# Patient Record
Sex: FEMALE | Race: WHITE | URBAN - METROPOLITAN AREA
[De-identification: names, ages, dates, MRNs, and addresses within clinical notes are randomized per-mention and may not be internally consistent; named-entity substitution may affect disease eponyms.]

---

## 2023-01-23 ENCOUNTER — TELEPHONE (OUTPATIENT)
Dept: OBGYN CLINIC | Facility: CLINIC | Age: 28
End: 2023-01-23

## 2023-01-23 ENCOUNTER — PATIENT MESSAGE (OUTPATIENT)
Dept: OBGYN CLINIC | Facility: CLINIC | Age: 28
End: 2023-01-23

## 2023-01-23 LAB
AMB EXT ANTIBODY SCREEN: NEGATIVE
AMB EXT GONOCOCCUS, NUCLEIC ACID AMP: NEGATIVE
AMB EXT HEMOGLOBIN: 13.6
AMB EXT HIV AG AB COMBO: NON REACTIVE
AMB EXT TSH: 2.88 MIU/ML
AMB EXT URINE CULTURE ROUTINE: NEGATIVE

## 2023-01-23 NOTE — TELEPHONE ENCOUNTER
Transferring care, patient's old office is requesting a records release send to their fax [phone] [facility].  [phone]

## 2023-01-23 NOTE — TELEPHONE ENCOUNTER
Pt Name and  verified. Pt is calling as she is seeking to possibly transfer care. Pt informed that TAYLOR needs to be filled out so that our office can request her records from previous facility. Pt TAMIE. This RN sent over link via Test message for her to sign up to [employer]. Pt received message to set up eyesFinderhart while on the phone with this RN and message sent with link to have her fill out TAYLOR. Look for [employer] message so TAYLOR can be sent to number provided.

## 2023-01-31 ENCOUNTER — PATIENT MESSAGE (OUTPATIENT)
Dept: OBGYN CLINIC | Facility: CLINIC | Age: 28
End: 2023-01-31

## 2023-01-31 NOTE — TELEPHONE ENCOUNTER
From: Dion Mccarty  To: Cecilia Hernandez  Sent: 1/23/2023 2:16 PM CST  Subject: TAYLOR    Please copy the following link on to your browser. Fill out form and sign, and send back to us so we may request your records. [email or web address]      Thank you,   Tariq Newton

## 2023-02-07 ENCOUNTER — NURSE ONLY (OUTPATIENT)
Dept: OBGYN CLINIC | Facility: CLINIC | Age: 28
End: 2023-02-07

## 2023-02-07 ENCOUNTER — TELEPHONE (OUTPATIENT)
Dept: OBGYN CLINIC | Facility: CLINIC | Age: 28
End: 2023-02-07

## 2023-02-07 VITALS — HEIGHT: 67 IN

## 2023-02-07 DIAGNOSIS — Z34.82 ENCOUNTER FOR SUPERVISION OF OTHER NORMAL PREGNANCY IN SECOND TRIMESTER: Primary | ICD-10-CM

## 2023-02-07 RX ORDER — CHOLECALCIFEROL (VITAMIN D3) 25 MCG
1 TABLET,CHEWABLE ORAL DAILY
COMMUNITY

## 2023-02-07 RX ORDER — NIFEDIPINE 20 MG/1
20 CAPSULE ORAL 3 TIMES DAILY
COMMUNITY

## 2023-02-07 RX ORDER — LEVOTHYROXINE SODIUM 0.1 MG/1
100 TABLET ORAL
COMMUNITY

## 2023-02-07 RX ORDER — BUPROPION HYDROCHLORIDE 300 MG/1
300 TABLET ORAL DAILY
COMMUNITY

## 2023-02-07 RX ORDER — PROGESTERONE 200 MG/1
200 CAPSULE ORAL NIGHTLY
COMMUNITY

## 2023-02-07 NOTE — PROGRESS NOTES
Had phone nurse consultation     Missed menses apt with: former provider at  [facility] [place] [facility] [place] [place]  Was on [place] with IUI to conceive [facility]    Pt transfering care to [facility] since she is moving to [facility]    Hx of PCOS, hypothyroid, depression and anxiety and cervical incompetence    Pt had a Shirley cerclage placement on 2023 by Dr. Martha Sierra  For cervical insufficiency based on ultrasound and no functional cervix on exam- found to be 3 cm dilated at 18 weeks      LMP: 2022    Pre  BMI: 36.8  EPDS score: 2    US: 22  Working JUSTUS: 23  Hx of genetic abnormality in family: None  Hx of varicella: Yes  Covid Vaccine:     Sterilization/Contraception: N/A    OUD Screening:Pt. Answered YES to any 5P questions and/or  risk factors are present. Per pt her father had hx of alcohol and drug abuse, but has been sober for over 10 years. Pt denied any other exposure to alcohol or substances, no referral per pt. Pt. Education was provided on OUD and pregnancy, including the benefits of treatment for mom and baby and the risk of STEVE. Pt to be given OUD and STEVE handouts. Educational material reviewed with patient: Prenatal care, nutrition, weight gain recommendations, travel, exercise, intercourse, pregnancy changes, safe medications, pregnancy and work, fetal movement, labor and  labor, warning signs, food safety, tdap, cord blood, breastfeeding, circumcision, and Group B strep. Blood transfusion if needed: Y  PN labs:  Will review medical record to assess need for additional labs    Pt had a normal FTS ultrasound 22  Amniocentesis 3/9/73    [facility] Media Policy: Reviewed    NOB apt: Scheduled with MLM

## 2023-02-23 ENCOUNTER — INITIAL PRENATAL (OUTPATIENT)
Dept: OBGYN CLINIC | Facility: CLINIC | Age: 28
End: 2023-02-23

## 2023-02-23 ENCOUNTER — TELEPHONE (OUTPATIENT)
Dept: OBGYN CLINIC | Facility: CLINIC | Age: 28
End: 2023-02-23

## 2023-02-23 VITALS — DIASTOLIC BLOOD PRESSURE: 78 MMHG | BODY MASS INDEX: 39 KG/M2 | WEIGHT: 249 LBS | SYSTOLIC BLOOD PRESSURE: 124 MMHG

## 2023-02-23 DIAGNOSIS — N88.3 INCOMPETENT CERVIX: ICD-10-CM

## 2023-02-23 DIAGNOSIS — E66.9 OBESITY, UNSPECIFIED CLASSIFICATION, UNSPECIFIED OBESITY TYPE, UNSPECIFIED WHETHER SERIOUS COMORBIDITY PRESENT: ICD-10-CM

## 2023-02-23 DIAGNOSIS — Z34.82 ENCOUNTER FOR SUPERVISION OF OTHER NORMAL PREGNANCY IN SECOND TRIMESTER: Primary | ICD-10-CM

## 2023-02-23 PROBLEM — O99.212 OBESITY AFFECTING PREGNANCY IN SECOND TRIMESTER: Status: ACTIVE | Noted: 2023-02-23

## 2023-02-23 PROBLEM — O09.812 PREGNANCY RESULTING FROM ASSISTED REPRODUCTIVE TECHNOLOGY IN SECOND TRIMESTER: Status: ACTIVE | Noted: 2023-02-23

## 2023-02-23 PROBLEM — O99.212 OBESITY AFFECTING PREGNANCY IN SECOND TRIMESTER (HCC): Status: ACTIVE | Noted: 2023-02-23

## 2023-02-23 PROBLEM — O09.812 PREGNANCY RESULTING FROM ASSISTED REPRODUCTIVE TECHNOLOGY IN SECOND TRIMESTER (HCC): Status: ACTIVE | Noted: 2023-02-23

## 2023-02-23 LAB
BILIRUBIN: NEGATIVE
GLUCOSE (URINE DIPSTICK): NEGATIVE MG/DL
KETONES (URINE DIPSTICK): NEGATIVE MG/DL
LEUKOCYTES: NEGATIVE
MULTISTIX LOT#: NORMAL NUMERIC
NITRITE, URINE: NEGATIVE
OCCULT BLOOD: NEGATIVE
PH, URINE: 6.5 (ref 4.5–8)
PROTEIN (URINE DIPSTICK): NEGATIVE MG/DL
SPECIFIC GRAVITY: 1.01 (ref 1–1.03)
URINE-COLOR: YELLOW
UROBILINOGEN,SEMI-QN: 0.2 MG/DL (ref 0–1.9)

## 2023-02-23 PROCEDURE — 81002 URINALYSIS NONAUTO W/O SCOPE: CPT | Performed by: OBSTETRICS & GYNECOLOGY

## 2023-02-23 PROCEDURE — 3078F DIAST BP <80 MM HG: CPT | Performed by: OBSTETRICS & GYNECOLOGY

## 2023-02-23 PROCEDURE — 3074F SYST BP LT 130 MM HG: CPT | Performed by: OBSTETRICS & GYNECOLOGY

## 2023-02-23 NOTE — PROGRESS NOTES
Transfer of Care. [place]oved [place]. H/O IUI. S/P Rescue Cerclage at 18 weeks due to Cervical Incompetence dilated to 3 cm. To see RICHELLE ASAP. Labs x 1 week.

## 2023-02-23 NOTE — TELEPHONE ENCOUNTER
Incoming fax received from [employer]. Written orders for breast pump, compression stockings, and sacroiliac support were signed by [facility] and faxed back.

## 2023-03-02 ENCOUNTER — LAB ENCOUNTER (OUTPATIENT)
Dept: LAB | Age: 28
End: 2023-03-02
Attending: OBSTETRICS & GYNECOLOGY
Payer: COMMERCIAL

## 2023-03-02 DIAGNOSIS — Z34.82 ENCOUNTER FOR SUPERVISION OF OTHER NORMAL PREGNANCY IN SECOND TRIMESTER: ICD-10-CM

## 2023-03-02 LAB
BASOPHILS # BLD AUTO: 0.02 X10(3) UL (ref 0–0.2)
BASOPHILS NFR BLD AUTO: 0.2 %
DEPRECATED HBV CORE AB SER IA-ACNC: 86.2 NG/ML
DEPRECATED RDW RBC AUTO: 40.5 FL (ref 35.1–46.3)
EOSINOPHIL # BLD AUTO: 0.07 X10(3) UL (ref 0–0.7)
EOSINOPHIL NFR BLD AUTO: 0.6 %
ERYTHROCYTE [DISTWIDTH] IN BLOOD BY AUTOMATED COUNT: 12 % (ref 11–15)
GLUCOSE 1H P GLC SERPL-MCNC: 162 MG/DL
HBV SURFACE AG SER-ACNC: 0.12 [IU]/L
HBV SURFACE AG SERPL QL IA: NONREACTIVE
HCT VFR BLD AUTO: 36.5 %
HCV AB SERPL QL IA: NONREACTIVE
HGB BLD-MCNC: 12.5 G/DL
IMM GRANULOCYTES # BLD AUTO: 0.05 X10(3) UL (ref 0–1)
IMM GRANULOCYTES NFR BLD: 0.4 %
LYMPHOCYTES # BLD AUTO: 2.2 X10(3) UL (ref 1–4)
LYMPHOCYTES NFR BLD AUTO: 17.6 %
MCH RBC QN AUTO: 31.6 PG (ref 26–34)
MCHC RBC AUTO-ENTMCNC: 34.2 G/DL (ref 31–37)
MCV RBC AUTO: 92.2 FL
MONOCYTES # BLD AUTO: 0.46 X10(3) UL (ref 0.1–1)
MONOCYTES NFR BLD AUTO: 3.7 %
NEUTROPHILS # BLD AUTO: 9.7 X10 (3) UL (ref 1.5–7.7)
NEUTROPHILS # BLD AUTO: 9.7 X10(3) UL (ref 1.5–7.7)
NEUTROPHILS NFR BLD AUTO: 77.5 %
PLATELET # BLD AUTO: 300 10(3)UL (ref 150–450)
RBC # BLD AUTO: 3.96 X10(6)UL
RUBV IGG SER QL: POSITIVE
RUBV IGG SER-ACNC: 26.6 IU/ML (ref 10–?)
TSI SER-ACNC: 0.58 MIU/ML (ref 0.36–3.74)
WBC # BLD AUTO: 12.5 X10(3) UL (ref 4–11)

## 2023-03-02 PROCEDURE — 87340 HEPATITIS B SURFACE AG IA: CPT

## 2023-03-02 PROCEDURE — 36415 COLL VENOUS BLD VENIPUNCTURE: CPT

## 2023-03-02 PROCEDURE — 85025 COMPLETE CBC W/AUTO DIFF WBC: CPT

## 2023-03-02 PROCEDURE — 82950 GLUCOSE TEST: CPT

## 2023-03-02 PROCEDURE — 84443 ASSAY THYROID STIM HORMONE: CPT

## 2023-03-02 PROCEDURE — 86803 HEPATITIS C AB TEST: CPT

## 2023-03-02 PROCEDURE — 82728 ASSAY OF FERRITIN: CPT | Performed by: OBSTETRICS & GYNECOLOGY

## 2023-03-02 PROCEDURE — 86777 TOXOPLASMA ANTIBODY: CPT

## 2023-03-02 PROCEDURE — 86780 TREPONEMA PALLIDUM: CPT

## 2023-03-02 PROCEDURE — 86778 TOXOPLASMA ANTIBODY IGM: CPT

## 2023-03-02 PROCEDURE — 86762 RUBELLA ANTIBODY: CPT

## 2023-03-03 ENCOUNTER — TELEPHONE (OUTPATIENT)
Dept: OBGYN CLINIC | Facility: CLINIC | Age: 28
End: 2023-03-03

## 2023-03-03 DIAGNOSIS — R73.09 ABNORMAL GTT (GLUCOSE TOLERANCE TEST): Primary | ICD-10-CM

## 2023-03-03 LAB — T PALLIDUM AB SER QL: NEGATIVE

## 2023-03-03 NOTE — TELEPHONE ENCOUNTER
----- Message from Mary Beth Bejarano MD sent at 3/2/2023 10:02 PM CST -----  Abnormal 1 hr GTT. Send for 3 hr GTT ASAP.

## 2023-03-03 NOTE — TELEPHONE ENCOUNTER
Attempted to reach patient no answer left detailed message to call back office. Ordered 3hr GTT for patient.

## 2023-03-03 NOTE — TELEPHONE ENCOUNTER
Patient name and  verified. Patient informed of MLM result note and recommendations. Order for 3 hour gtt entered. Patient states she is on bed rest and cannot sit up right. Patient is agreeable to 3 hour gtt but unsure if she can be accommodated with lab or should she wait until she is evaluated with MFM. This RN contacted lab and was informed Leory Spurling- lab supervisor that only [facility] lab has a recliner that patient can use during 3 hour. Did mention pt will not be laying down but does recline half way. If ML[facility] is agreeable Carla Martinez will need to be contacted for accommodations at ext. [phone].

## 2023-03-04 LAB
TOXOPLASMA GONDII AB, IGG: 3.2 IU/ML
TOXOPLASMA GONDII AB, IGM: <3 AU/ML

## 2023-03-09 ENCOUNTER — ROUTINE PRENATAL (OUTPATIENT)
Dept: OBGYN CLINIC | Facility: CLINIC | Age: 28
End: 2023-03-09

## 2023-03-09 VITALS — SYSTOLIC BLOOD PRESSURE: 127 MMHG | BODY MASS INDEX: 40 KG/M2 | WEIGHT: 253.75 LBS | DIASTOLIC BLOOD PRESSURE: 82 MMHG

## 2023-03-09 DIAGNOSIS — R73.09 ABNORMAL GTT (GLUCOSE TOLERANCE TEST): ICD-10-CM

## 2023-03-09 DIAGNOSIS — Z34.83 ENCOUNTER FOR SUPERVISION OF OTHER NORMAL PREGNANCY IN THIRD TRIMESTER: Primary | ICD-10-CM

## 2023-03-09 LAB
BILIRUBIN: NEGATIVE
GLUCOSE (URINE DIPSTICK): NEGATIVE MG/DL
KETONES (URINE DIPSTICK): NEGATIVE MG/DL
LEUKOCYTES: NEGATIVE
MULTISTIX LOT#: ABNORMAL NUMERIC
NITRITE, URINE: NEGATIVE
OCCULT BLOOD: NEGATIVE
PH, URINE: 6 (ref 4.5–8)
PROTEIN (URINE DIPSTICK): NEGATIVE MG/DL
SPECIFIC GRAVITY: 1.01 (ref 1–1.03)
URINE-COLOR: YELLOW
UROBILINOGEN,SEMI-QN: 0.2 MG/DL (ref 0–1.9)

## 2023-03-09 PROCEDURE — 3074F SYST BP LT 130 MM HG: CPT | Performed by: OBSTETRICS & GYNECOLOGY

## 2023-03-09 PROCEDURE — 90715 TDAP VACCINE 7 YRS/> IM: CPT | Performed by: OBSTETRICS & GYNECOLOGY

## 2023-03-09 PROCEDURE — 81003 URINALYSIS AUTO W/O SCOPE: CPT | Performed by: OBSTETRICS & GYNECOLOGY

## 2023-03-09 PROCEDURE — 3079F DIAST BP 80-89 MM HG: CPT | Performed by: OBSTETRICS & GYNECOLOGY

## 2023-03-09 PROCEDURE — 90471 IMMUNIZATION ADMIN: CPT | Performed by: OBSTETRICS & GYNECOLOGY

## 2023-03-09 RX ORDER — LANCETS
EACH MISCELLANEOUS
Qty: 200 EACH | Refills: 2 | Status: SHIPPED | OUTPATIENT
Start: 2023-03-09

## 2023-03-09 NOTE — PROGRESS NOTES
1hr GTT abnormal, will do 4x daily accucheck instead of 3hr GTT. Seeing M tomorrow for US. Kick Counts Reviewed.

## 2023-03-10 ENCOUNTER — HOSPITAL ENCOUNTER (OUTPATIENT)
Dept: PERINATAL CARE | Facility: HOSPITAL | Age: 28
Discharge: HOME | End: 2023-03-10
Attending: OBSTETRICS & GYNECOLOGY
Payer: COMMERCIAL

## 2023-03-10 VITALS
BODY MASS INDEX: 39 KG/M2 | SYSTOLIC BLOOD PRESSURE: 129 MMHG | DIASTOLIC BLOOD PRESSURE: 76 MMHG | WEIGHT: 250 LBS | HEART RATE: 118 BPM

## 2023-03-10 DIAGNOSIS — O99.212 OBESITY AFFECTING PREGNANCY IN SECOND TRIMESTER: Primary | ICD-10-CM

## 2023-03-10 DIAGNOSIS — N88.3 INCOMPETENT CERVIX: ICD-10-CM

## 2023-03-10 DIAGNOSIS — O34.32 CERVICAL CERCLAGE SUTURE PRESENT IN SECOND TRIMESTER: ICD-10-CM

## 2023-03-10 DIAGNOSIS — O99.282 HYPOTHYROIDISM COMPLICATING PREGNANCY, SECOND TRIMESTER: ICD-10-CM

## 2023-03-10 DIAGNOSIS — E03.9 HYPOTHYROIDISM COMPLICATING PREGNANCY, SECOND TRIMESTER: ICD-10-CM

## 2023-03-10 DIAGNOSIS — O99.212 OBESITY AFFECTING PREGNANCY IN SECOND TRIMESTER: ICD-10-CM

## 2023-03-10 PROCEDURE — 76811 OB US DETAILED SNGL FETUS: CPT | Performed by: OBSTETRICS & GYNECOLOGY

## 2023-03-20 ENCOUNTER — LABORATORY ENCOUNTER (OUTPATIENT)
Dept: LAB | Facility: HOSPITAL | Age: 28
End: 2023-03-20
Attending: OBSTETRICS & GYNECOLOGY
Payer: COMMERCIAL

## 2023-03-20 DIAGNOSIS — R73.09 ABNORMAL GTT (GLUCOSE TOLERANCE TEST): ICD-10-CM

## 2023-03-20 LAB
BASOPHILS # BLD AUTO: 0.04 X10(3) UL (ref 0–0.2)
BASOPHILS NFR BLD AUTO: 0.3 %
DEPRECATED HBV CORE AB SER IA-ACNC: 67 NG/ML
DEPRECATED RDW RBC AUTO: 39.8 FL (ref 35.1–46.3)
EOSINOPHIL # BLD AUTO: 0.15 X10(3) UL (ref 0–0.7)
EOSINOPHIL NFR BLD AUTO: 1.1 %
ERYTHROCYTE [DISTWIDTH] IN BLOOD BY AUTOMATED COUNT: 11.9 % (ref 11–15)
GLUCOSE 1H P GLC SERPL-MCNC: 158 MG/DL
GLUCOSE 2H P GLC SERPL-MCNC: 186 MG/DL
GLUCOSE 3H P GLC SERPL-MCNC: 144 MG/DL (ref 70–140)
GLUCOSE P FAST SERPL-MCNC: 80 MG/DL
HCT VFR BLD AUTO: 36 %
HGB BLD-MCNC: 12.5 G/DL
IMM GRANULOCYTES # BLD AUTO: 0.07 X10(3) UL (ref 0–1)
IMM GRANULOCYTES NFR BLD: 0.5 %
LYMPHOCYTES # BLD AUTO: 2.89 X10(3) UL (ref 1–4)
LYMPHOCYTES NFR BLD AUTO: 21.3 %
MCH RBC QN AUTO: 31.7 PG (ref 26–34)
MCHC RBC AUTO-ENTMCNC: 34.7 G/DL (ref 31–37)
MCV RBC AUTO: 91.4 FL
MONOCYTES # BLD AUTO: 0.7 X10(3) UL (ref 0.1–1)
MONOCYTES NFR BLD AUTO: 5.2 %
NEUTROPHILS # BLD AUTO: 9.73 X10 (3) UL (ref 1.5–7.7)
NEUTROPHILS # BLD AUTO: 9.73 X10(3) UL (ref 1.5–7.7)
NEUTROPHILS NFR BLD AUTO: 71.6 %
PLATELET # BLD AUTO: 278 10(3)UL (ref 150–450)
RBC # BLD AUTO: 3.94 X10(6)UL
WBC # BLD AUTO: 13.6 X10(3) UL (ref 4–11)

## 2023-03-20 PROCEDURE — 82952 GTT-ADDED SAMPLES: CPT

## 2023-03-20 PROCEDURE — 87389 HIV-1 AG W/HIV-1&-2 AB AG IA: CPT | Performed by: OBSTETRICS & GYNECOLOGY

## 2023-03-20 PROCEDURE — 85025 COMPLETE CBC W/AUTO DIFF WBC: CPT | Performed by: OBSTETRICS & GYNECOLOGY

## 2023-03-20 PROCEDURE — 86780 TREPONEMA PALLIDUM: CPT | Performed by: OBSTETRICS & GYNECOLOGY

## 2023-03-20 PROCEDURE — 82728 ASSAY OF FERRITIN: CPT | Performed by: OBSTETRICS & GYNECOLOGY

## 2023-03-20 PROCEDURE — 36415 COLL VENOUS BLD VENIPUNCTURE: CPT | Performed by: OBSTETRICS & GYNECOLOGY

## 2023-03-20 PROCEDURE — 82951 GLUCOSE TOLERANCE TEST (GTT): CPT

## 2023-03-21 ENCOUNTER — PATIENT MESSAGE (OUTPATIENT)
Dept: OBGYN CLINIC | Facility: CLINIC | Age: 28
End: 2023-03-21

## 2023-03-21 DIAGNOSIS — O24.410 DIET CONTROLLED GESTATIONAL DIABETES MELLITUS (GDM) IN THIRD TRIMESTER: Primary | ICD-10-CM

## 2023-03-22 LAB — T PALLIDUM AB SER QL: NEGATIVE

## 2023-03-24 ENCOUNTER — ROUTINE PRENATAL (OUTPATIENT)
Dept: OBGYN CLINIC | Facility: CLINIC | Age: 28
End: 2023-03-24

## 2023-03-24 VITALS — BODY MASS INDEX: 40 KG/M2 | DIASTOLIC BLOOD PRESSURE: 80 MMHG | SYSTOLIC BLOOD PRESSURE: 126 MMHG | WEIGHT: 254 LBS

## 2023-03-24 DIAGNOSIS — Z34.83 ENCOUNTER FOR SUPERVISION OF OTHER NORMAL PREGNANCY IN THIRD TRIMESTER: Primary | ICD-10-CM

## 2023-03-24 LAB
APPEARANCE: CLEAR
BILIRUBIN: NEGATIVE
GLUCOSE (URINE DIPSTICK): NEGATIVE MG/DL
KETONES (URINE DIPSTICK): 80 MG/DL
MULTISTIX LOT#: ABNORMAL NUMERIC
NITRITE, URINE: NEGATIVE
PH, URINE: 6 (ref 4.5–8)
PROTEIN (URINE DIPSTICK): NEGATIVE MG/DL
SPECIFIC GRAVITY: 1.02 (ref 1–1.03)
URINE-COLOR: YELLOW
UROBILINOGEN,SEMI-QN: 0.2 MG/DL (ref 0–1.9)

## 2023-03-24 PROCEDURE — 3079F DIAST BP 80-89 MM HG: CPT | Performed by: OBSTETRICS & GYNECOLOGY

## 2023-03-24 PROCEDURE — 3074F SYST BP LT 130 MM HG: CPT | Performed by: OBSTETRICS & GYNECOLOGY

## 2023-03-24 PROCEDURE — 81003 URINALYSIS AUTO W/O SCOPE: CPT | Performed by: OBSTETRICS & GYNECOLOGY

## 2023-03-24 RX ORDER — LEVOTHYROXINE SODIUM 0.07 MG/1
TABLET ORAL
COMMUNITY
Start: 2023-01-09

## 2023-03-24 NOTE — PROGRESS NOTES
Blood sugars all wnl per pt. Has appt with diabetes center. Started checking her blood sugars about 2 weeks ago after her mfm appt. On modified bedrest due to rescue cerclage hx. On vag prog. No c/o ptl sx.

## 2023-03-27 ENCOUNTER — HOSPITAL ENCOUNTER (OUTPATIENT)
Dept: ENDOCRINOLOGY | Facility: HOSPITAL | Age: 28
End: 2023-03-27
Attending: OBSTETRICS & GYNECOLOGY
Payer: COMMERCIAL

## 2023-03-27 DIAGNOSIS — O24.419 GESTATIONAL DIABETES MELLITUS (GDM), ANTEPARTUM, GESTATIONAL DIABETES METHOD OF CONTROL UNSPECIFIED: Primary | ICD-10-CM

## 2023-03-27 RX ORDER — BLOOD-GLUCOSE METER
1 EACH MISCELLANEOUS 4 TIMES DAILY
Qty: 1 KIT | Refills: 0 | Status: SHIPPED | OUTPATIENT
Start: 2023-03-27 | End: 2023-07-25

## 2023-03-27 RX ORDER — LANCETS 30 GAUGE
4 EACH MISCELLANEOUS 4 TIMES DAILY
Qty: 100 EACH | Refills: 3 | Status: SHIPPED | OUTPATIENT
Start: 2023-03-27 | End: 2023-07-25

## 2023-03-27 RX ORDER — BLOOD SUGAR DIAGNOSTIC
1 STRIP MISCELLANEOUS 4 TIMES DAILY
Qty: 100 STRIP | Refills: 5 | Status: SHIPPED | OUTPATIENT
Start: 2023-03-27 | End: 2023-07-25

## 2023-03-27 NOTE — PROGRESS NOTES
Funmilayo Mims  : 1995 was seen for Gestational Diabetes Counseling: Individual    Date: 3/27/2023   Start time: 1015A End time: 1115A  Realtime audio-video visit via [employer] due to pt on bedrest.  Pt verbally consents to this audio-video visit. Obtained usual diet history:   Saw nutritionist at fertility clinic  (am) frank burk (44g), avocado, egg, everything but the bagel seasoning   (snacks) harder, typically just has meals  (lunch) beef jerky, cheese, unsweetened apple sauce   (dinner) breaded chicken, corn on the cob  (snacks)     Education:     GDM Overview:  Reviewed gestational diabetes as diagnosis including target blood glucose values. Benefits, risks, and management options for improving/maintaining glucose control to mother/baby discussed. Instructed /demonstrated ability to perform blood glucose testing on:   Discussed monitoring ketones. Healthy Eating:  Discussed nutrition concepts for pregnancy/healthy eating and effects of food on BG value. Timing of meals; what is a carbohydrate, protein, fat. Taught: Carb counting, label reading, meal planning. Suggested Calorie Level: 2000    Being Active:  Benefits and effects of activity on BG discussed. Monitoring:  Instructed on how to use glucose monitor/proper lancet disposal. Testing schedules are:   Fastin-95 mg/dL, Call MD if >95 mg/dL twice in 1 week   2 Hour Post Prandial:  120 md/dL, Call MD if >120 mg/dL twice in 1 week. Taking Medication:  Reviewed when medication might be indicated. Reducing Risk:  Effects of elevated blood glucose on mother/baby reviewed. Discussed management (hyperglycemia, hypoglycemia, sick day, DKA, other) and when to call provider. Post pregnancy management/prevention of Type 2 DM, and increased risk of having diabetes later in life reviewed. Healthy Coping:  Family involvement/social support encouraged.   Identification of lifestyle behaviors willing to change discussed. Training Tools Provided:  Printed materials covering each topic provided. Support Plan provided and reviewed. Patient Chosen Goals:      1. Follow recommended GDM meal plan. 2. Begin testing fasting glucose and 2 hours after meals   3. Bring glucose log to each MD office visit. 4. Encouraged activity if no restrictions. 5. Encouraged Carolyne Dafne to call diabetes center with any questions or concerns. Patient verbalized understanding and has no further questions at this time.     Eulalio Libman, RD

## 2023-04-03 ENCOUNTER — ROUTINE PRENATAL (OUTPATIENT)
Dept: OBGYN CLINIC | Facility: CLINIC | Age: 28
End: 2023-04-03

## 2023-04-03 VITALS — SYSTOLIC BLOOD PRESSURE: 122 MMHG | BODY MASS INDEX: 40 KG/M2 | WEIGHT: 254 LBS | DIASTOLIC BLOOD PRESSURE: 76 MMHG

## 2023-04-03 DIAGNOSIS — Z34.83 ENCOUNTER FOR SUPERVISION OF OTHER NORMAL PREGNANCY IN THIRD TRIMESTER: Primary | ICD-10-CM

## 2023-04-03 LAB
APPEARANCE: CLEAR
BILIRUBIN: NEGATIVE
GLUCOSE (URINE DIPSTICK): NEGATIVE MG/DL
KETONES (URINE DIPSTICK): NEGATIVE MG/DL
LEUKOCYTES: NEGATIVE
MULTISTIX LOT#: NORMAL NUMERIC
NITRITE, URINE: NEGATIVE
OCCULT BLOOD: NEGATIVE
PH, URINE: 6.5 (ref 4.5–8)
PROTEIN (URINE DIPSTICK): NEGATIVE MG/DL
SPECIFIC GRAVITY: 1.01 (ref 1–1.03)
URINE-COLOR: YELLOW
UROBILINOGEN,SEMI-QN: 0.2 MG/DL (ref 0–1.9)

## 2023-04-03 PROCEDURE — 3074F SYST BP LT 130 MM HG: CPT | Performed by: OBSTETRICS & GYNECOLOGY

## 2023-04-03 PROCEDURE — 81002 URINALYSIS NONAUTO W/O SCOPE: CPT | Performed by: OBSTETRICS & GYNECOLOGY

## 2023-04-03 PROCEDURE — 3078F DIAST BP <80 MM HG: CPT | Performed by: OBSTETRICS & GYNECOLOGY

## 2023-04-03 NOTE — PROGRESS NOTES
Pt doing well. Pregnancy counseling. No c/o. Good duke. [place] alexis. bs wnl since she started evening snacks.

## 2023-04-07 ENCOUNTER — TELEPHONE (OUTPATIENT)
Dept: PERINATAL CARE | Facility: HOSPITAL | Age: 28
End: 2023-04-07

## 2023-04-07 ENCOUNTER — HOSPITAL ENCOUNTER (OUTPATIENT)
Dept: PERINATAL CARE | Facility: HOSPITAL | Age: 28
Discharge: HOME | End: 2023-04-07
Attending: OBSTETRICS & GYNECOLOGY
Payer: COMMERCIAL

## 2023-04-07 ENCOUNTER — HOSPITAL ENCOUNTER (OUTPATIENT)
Facility: HOSPITAL | Age: 28
Discharge: HOME | End: 2023-04-07
Attending: OBSTETRICS & GYNECOLOGY | Admitting: OBSTETRICS & GYNECOLOGY
Payer: COMMERCIAL

## 2023-04-07 VITALS — DIASTOLIC BLOOD PRESSURE: 76 MMHG | SYSTOLIC BLOOD PRESSURE: 122 MMHG | HEART RATE: 106 BPM

## 2023-04-07 VITALS
SYSTOLIC BLOOD PRESSURE: 126 MMHG | BODY MASS INDEX: 40 KG/M2 | HEART RATE: 105 BPM | DIASTOLIC BLOOD PRESSURE: 75 MMHG | WEIGHT: 254 LBS

## 2023-04-07 DIAGNOSIS — O09.812 PREGNANCY RESULTING FROM ASSISTED REPRODUCTIVE TECHNOLOGY IN SECOND TRIMESTER: ICD-10-CM

## 2023-04-07 DIAGNOSIS — O99.282 HYPOTHYROIDISM COMPLICATING PREGNANCY, SECOND TRIMESTER: ICD-10-CM

## 2023-04-07 DIAGNOSIS — N88.3 INCOMPETENT CERVIX: ICD-10-CM

## 2023-04-07 DIAGNOSIS — O34.32 CERVICAL CERCLAGE SUTURE PRESENT IN SECOND TRIMESTER: ICD-10-CM

## 2023-04-07 DIAGNOSIS — O99.213 OBESITY AFFECTING PREGNANCY IN THIRD TRIMESTER: Primary | ICD-10-CM

## 2023-04-07 DIAGNOSIS — O24.410 DIET CONTROLLED GESTATIONAL DIABETES MELLITUS (GDM) IN THIRD TRIMESTER: ICD-10-CM

## 2023-04-07 DIAGNOSIS — O99.213 OBESITY AFFECTING PREGNANCY IN THIRD TRIMESTER: ICD-10-CM

## 2023-04-07 DIAGNOSIS — O34.33 CERVICAL CERCLAGE SUTURE PRESENT IN THIRD TRIMESTER: ICD-10-CM

## 2023-04-07 DIAGNOSIS — E03.9 HYPOTHYROIDISM AFFECTING PREGNANCY IN THIRD TRIMESTER: ICD-10-CM

## 2023-04-07 DIAGNOSIS — E03.9 HYPOTHYROIDISM COMPLICATING PREGNANCY, SECOND TRIMESTER: ICD-10-CM

## 2023-04-07 DIAGNOSIS — O99.283 HYPOTHYROIDISM AFFECTING PREGNANCY IN THIRD TRIMESTER: ICD-10-CM

## 2023-04-07 PROCEDURE — 76816 OB US FOLLOW-UP PER FETUS: CPT | Performed by: OBSTETRICS & GYNECOLOGY

## 2023-04-07 PROCEDURE — 59025 FETAL NON-STRESS TEST: CPT

## 2023-04-07 PROCEDURE — 99214 OFFICE O/P EST MOD 30 MIN: CPT

## 2023-04-07 NOTE — TRIAGE
[facility]      Triage Note    Allyssa Goddard Patient Status:  Outpatient    1995 MRN [id number]   Location [address] Attending Belen Stephenson MD   Hosp Day # 0 PCP No primary care provider on file.  Para:   Estimated Date of Delivery: 23  Gestation: 31w2d    Chief Complaint    R/o Rom         Allergies:    Seasonal                ITCHING    No orders of the defined types were placed in this encounter. Lab Results   Component Value Date    WBC 13.6 (H) 2023    HGB 12.5 2023    HCT 36.0 2023    .0 2023    TSH 0.581 2023       Clinitek UA  Lab Results   Component Value Date    SPECGRAVITY 1.015 2023    URINECUL No Growth at 18-24 hrs. 2023       UA  Lab Results   Component Value Date    SPECGRAVITY 1.015 2023    NITRITE Negative 2023  1645   BP: 122/76   Pulse: 106       NST  Variability: Moderate           Accelerations: Yes           Decelerations: None            Baseline: 140 BPM           Uterine Irritability: No           Contractions: Not present                                        Acoustic Stimulator: No           Nonstress Test Interpretation: Appropriate for gestational age           Nonstress Test Second Interpretation: Appropriate for gestational age          FHR Category: Category I             Additional Comments       Patient presents with:  R/o Rom: Pt  has had increased discharge last 3-4 days. Came after mfm appointment. ANTIONE. Pt reports good fetal movement. ANTIONE wnl in mfm. Sterile speculum exam with no pooling of fluid, negative ferning, negative amniotest. NST appropriate for gestational age. Findings reported to Dr Jennifer Salvador per telephone. Pt dc home with labor precautions and kick counts. Pt and SO with verbalized understanding.      Erasto Bhakta RN  2023 5:18 PM

## 2023-04-10 ENCOUNTER — HOSPITAL ENCOUNTER (OUTPATIENT)
Dept: ENDOCRINOLOGY | Facility: HOSPITAL | Age: 28
Discharge: HOME | End: 2023-04-10
Attending: OBSTETRICS & GYNECOLOGY
Payer: COMMERCIAL

## 2023-04-10 NOTE — PROGRESS NOTES
Last visit:  Saw nutritionist at fertility clinic  (am) frank burk (44g), avocado, egg, everything but the bagel seasoning   (snacks) harder, typically just has meals  (lunch) beef jerky, cheese, unsweetened apple sauce   (dinner) breaded chicken, corn on the cob    Maintaining weight    32 weeks  Fasting 90 and under  2 hour pp: under 120 (124 after dinner a few days ago)    Diet Recall today:  (am) apple   (noon) chick filet grilled nuggets (ranch), french fries (1/2)  (pm) 1/4 quesadilla, tortilla soup (tortilla strips)   (am) went out to eat - 2 sausage, 2 allen, 2 eggs, 1 multigrain french toast   (noon) 2 chicken melts (17g carbs x 2), apple   Drinking a lot of water    Logging in notes on phone  Haven't kept track of carbs  Milk, first trimester, threw up

## 2023-04-12 ENCOUNTER — APPOINTMENT (OUTPATIENT)
Dept: OBGYN CLINIC | Facility: HOSPITAL | Age: 28
End: 2023-04-12
Payer: COMMERCIAL

## 2023-04-12 ENCOUNTER — HOSPITAL ENCOUNTER (OUTPATIENT)
Facility: HOSPITAL | Age: 28
Discharge: HOME | End: 2023-04-12
Attending: OBSTETRICS & GYNECOLOGY | Admitting: OBSTETRICS & GYNECOLOGY
Payer: COMMERCIAL

## 2023-04-12 PROCEDURE — 96372 THER/PROPH/DIAG INJ SC/IM: CPT

## 2023-04-12 RX ORDER — BETAMETHASONE SODIUM PHOSPHATE AND BETAMETHASONE ACETATE 3; 3 MG/ML; MG/ML
INJECTION, SUSPENSION INTRA-ARTICULAR; INTRALESIONAL; INTRAMUSCULAR; SOFT TISSUE
Status: COMPLETED
Start: 2023-04-12 | End: 2023-04-12

## 2023-04-12 RX ORDER — BETAMETHASONE SODIUM PHOSPHATE AND BETAMETHASONE ACETATE 3; 3 MG/ML; MG/ML
12 INJECTION, SUSPENSION INTRA-ARTICULAR; INTRALESIONAL; INTRAMUSCULAR; SOFT TISSUE EVERY 24 HOURS
Status: DISCONTINUED | OUTPATIENT
Start: 2023-04-12 | End: 2023-04-12

## 2023-04-12 NOTE — TRIAGE
[facility]      Triage Note    Carolyne Leos Patient Status:  Outpatient    1995 MRN [id number]   Location [address] Attending Jerome Jerez MD   Hosp Day # 0 PCP No primary care provider on file.  Para:   Estimated Date of Delivery: 23  Gestation: 32w0d        Allergies:    Seasonal                ITCHING    No orders of the defined types were placed in this encounter. Lab Results   Component Value Date    WBC 13.6 (H) 2023    HGB 12.5 2023    HCT 36.0 2023    .0 2023    TSH 0.581 2023       Clinitek UA  Lab Results   Component Value Date    SPECGRAVITY 1.015 2023    URINECUL No Growth at 18-24 hrs. 2023       UA  Lab Results   Component Value Date    SPECGRAVITY 1.015 2023    NITRITE Negative 2023       There were no vitals filed for this visit. NST                                                                                                                                                         Additional Comments   Pt here for betamethasone injection per Dr. Gualberto Leggett note. First dose betamethasone injection received today and pt stated that she is returning for second dose on 23 per Dr. Emmanuel Colon. RN notified pt that betamethasone given 24 hrs apart and pt stated that Dr. Emmanuel Colon told her that she can receive the second dose on . Pt scheduled to return 23 at 6 am. Dr. Violette Abdalla notified and T.O. received to discharge pt home. No chief complaint on file.         Tessa Dupont RN  2023 11:56 AM

## 2023-04-14 ENCOUNTER — APPOINTMENT (OUTPATIENT)
Dept: OBGYN CLINIC | Facility: HOSPITAL | Age: 28
End: 2023-04-14
Payer: COMMERCIAL

## 2023-04-14 ENCOUNTER — HOSPITAL ENCOUNTER (OUTPATIENT)
Facility: HOSPITAL | Age: 28
Discharge: HOME | End: 2023-04-14
Attending: OBSTETRICS & GYNECOLOGY | Admitting: OBSTETRICS & GYNECOLOGY
Payer: COMMERCIAL

## 2023-04-14 PROCEDURE — 96372 THER/PROPH/DIAG INJ SC/IM: CPT

## 2023-04-14 RX ORDER — BETAMETHASONE SODIUM PHOSPHATE AND BETAMETHASONE ACETATE 3; 3 MG/ML; MG/ML
12 INJECTION, SUSPENSION INTRA-ARTICULAR; INTRALESIONAL; INTRAMUSCULAR; SOFT TISSUE EVERY 24 HOURS
Status: DISCONTINUED | OUTPATIENT
Start: 2023-04-14 | End: 2023-04-14

## 2023-04-18 ENCOUNTER — ROUTINE PRENATAL (OUTPATIENT)
Dept: OBGYN CLINIC | Facility: CLINIC | Age: 28
End: 2023-04-18

## 2023-04-18 VITALS — DIASTOLIC BLOOD PRESSURE: 86 MMHG | WEIGHT: 251 LBS | SYSTOLIC BLOOD PRESSURE: 136 MMHG | BODY MASS INDEX: 39 KG/M2

## 2023-04-18 DIAGNOSIS — N88.3 INCOMPETENT CERVIX: ICD-10-CM

## 2023-04-18 DIAGNOSIS — O09.819 PREGNANCY RESULTING FROM ASSISTED REPRODUCTIVE TECHNOLOGY, ANTEPARTUM: ICD-10-CM

## 2023-04-18 DIAGNOSIS — Z34.83 ENCOUNTER FOR SUPERVISION OF OTHER NORMAL PREGNANCY IN THIRD TRIMESTER: Primary | ICD-10-CM

## 2023-04-18 DIAGNOSIS — O24.410 DIET CONTROLLED GESTATIONAL DIABETES MELLITUS (GDM) IN THIRD TRIMESTER: ICD-10-CM

## 2023-04-18 DIAGNOSIS — O99.210 OBESITY AFFECTING PREGNANCY, ANTEPARTUM: ICD-10-CM

## 2023-04-18 PROCEDURE — 3079F DIAST BP 80-89 MM HG: CPT | Performed by: OBSTETRICS & GYNECOLOGY

## 2023-04-18 PROCEDURE — 3075F SYST BP GE 130 - 139MM HG: CPT | Performed by: OBSTETRICS & GYNECOLOGY

## 2023-04-18 PROCEDURE — 81002 URINALYSIS NONAUTO W/O SCOPE: CPT | Performed by: OBSTETRICS & GYNECOLOGY

## 2023-05-02 ENCOUNTER — LAB ENCOUNTER (OUTPATIENT)
Dept: LAB | Age: 28
End: 2023-05-02
Attending: OBSTETRICS & GYNECOLOGY
Payer: COMMERCIAL

## 2023-05-02 DIAGNOSIS — Z34.83 ENCOUNTER FOR SUPERVISION OF OTHER NORMAL PREGNANCY IN THIRD TRIMESTER: ICD-10-CM

## 2023-05-02 LAB
BASOPHILS # BLD AUTO: 0.03 X10(3) UL (ref 0–0.2)
BASOPHILS NFR BLD AUTO: 0.3 %
DEPRECATED RDW RBC AUTO: 39.8 FL (ref 35.1–46.3)
EOSINOPHIL # BLD AUTO: 0.08 X10(3) UL (ref 0–0.7)
EOSINOPHIL NFR BLD AUTO: 0.7 %
ERYTHROCYTE [DISTWIDTH] IN BLOOD BY AUTOMATED COUNT: 12 % (ref 11–15)
HCT VFR BLD AUTO: 37.1 %
HGB BLD-MCNC: 12.8 G/DL
IMM GRANULOCYTES # BLD AUTO: 0.04 X10(3) UL (ref 0–1)
IMM GRANULOCYTES NFR BLD: 0.4 %
LYMPHOCYTES # BLD AUTO: 2.28 X10(3) UL (ref 1–4)
LYMPHOCYTES NFR BLD AUTO: 20.2 %
MCH RBC QN AUTO: 31.3 PG (ref 26–34)
MCHC RBC AUTO-ENTMCNC: 34.5 G/DL (ref 31–37)
MCV RBC AUTO: 90.7 FL
MONOCYTES # BLD AUTO: 0.58 X10(3) UL (ref 0.1–1)
MONOCYTES NFR BLD AUTO: 5.1 %
NEUTROPHILS # BLD AUTO: 8.26 X10 (3) UL (ref 1.5–7.7)
NEUTROPHILS # BLD AUTO: 8.26 X10(3) UL (ref 1.5–7.7)
NEUTROPHILS NFR BLD AUTO: 73.3 %
PLATELET # BLD AUTO: 276 10(3)UL (ref 150–450)
RBC # BLD AUTO: 4.09 X10(6)UL
WBC # BLD AUTO: 11.3 X10(3) UL (ref 4–11)

## 2023-05-02 PROCEDURE — 86780 TREPONEMA PALLIDUM: CPT

## 2023-05-02 PROCEDURE — 36415 COLL VENOUS BLD VENIPUNCTURE: CPT

## 2023-05-02 PROCEDURE — 87389 HIV-1 AG W/HIV-1&-2 AB AG IA: CPT

## 2023-05-02 PROCEDURE — 85025 COMPLETE CBC W/AUTO DIFF WBC: CPT

## 2023-05-03 LAB — T PALLIDUM AB SER QL: NEGATIVE

## 2023-05-04 ENCOUNTER — ROUTINE PRENATAL (OUTPATIENT)
Dept: OBGYN CLINIC | Facility: CLINIC | Age: 28
End: 2023-05-04

## 2023-05-04 VITALS — WEIGHT: 257 LBS | DIASTOLIC BLOOD PRESSURE: 83 MMHG | SYSTOLIC BLOOD PRESSURE: 121 MMHG | BODY MASS INDEX: 40 KG/M2

## 2023-05-04 DIAGNOSIS — Z34.83 ENCOUNTER FOR SUPERVISION OF OTHER NORMAL PREGNANCY IN THIRD TRIMESTER: Primary | ICD-10-CM

## 2023-05-04 LAB
APPEARANCE: CLEAR
BILIRUBIN: NEGATIVE
GLUCOSE (URINE DIPSTICK): NEGATIVE MG/DL
KETONES (URINE DIPSTICK): NEGATIVE MG/DL
LEUKOCYTES: NEGATIVE
MULTISTIX LOT#: NORMAL NUMERIC
NITRITE, URINE: NEGATIVE
OCCULT BLOOD: NEGATIVE
PH, URINE: 5 (ref 4.5–8)
PROTEIN (URINE DIPSTICK): NEGATIVE MG/DL
SPECIFIC GRAVITY: 1 (ref 1–1.03)
URINE-COLOR: YELLOW
UROBILINOGEN,SEMI-QN: 0.2 MG/DL (ref 0–1.9)

## 2023-05-04 PROCEDURE — 3074F SYST BP LT 130 MM HG: CPT | Performed by: OBSTETRICS & GYNECOLOGY

## 2023-05-04 PROCEDURE — 3079F DIAST BP 80-89 MM HG: CPT | Performed by: OBSTETRICS & GYNECOLOGY

## 2023-05-04 PROCEDURE — 81002 URINALYSIS NONAUTO W/O SCOPE: CPT | Performed by: OBSTETRICS & GYNECOLOGY

## 2023-05-05 ENCOUNTER — PATIENT MESSAGE (OUTPATIENT)
Dept: OBGYN CLINIC | Facility: CLINIC | Age: 28
End: 2023-05-05

## 2023-05-05 NOTE — TELEPHONE ENCOUNTER
Printed and given to providers and faxed to [phone] [facility] for TA and FBC.

## 2023-05-05 NOTE — TELEPHONE ENCOUNTER
Please print patient's Birth Plan and place it on all doctor's desks for review. Also send a copy to [facility] to be reviewed by the Nursing staff.

## 2023-05-09 ENCOUNTER — ROUTINE PRENATAL (OUTPATIENT)
Dept: OBGYN CLINIC | Facility: CLINIC | Age: 28
End: 2023-05-09

## 2023-05-09 VITALS
BODY MASS INDEX: 40 KG/M2 | DIASTOLIC BLOOD PRESSURE: 84 MMHG | WEIGHT: 255 LBS | SYSTOLIC BLOOD PRESSURE: 120 MMHG | HEART RATE: 88 BPM

## 2023-05-09 DIAGNOSIS — Z34.83 ENCOUNTER FOR SUPERVISION OF OTHER NORMAL PREGNANCY IN THIRD TRIMESTER: Primary | ICD-10-CM

## 2023-05-09 PROCEDURE — 3079F DIAST BP 80-89 MM HG: CPT | Performed by: OBSTETRICS & GYNECOLOGY

## 2023-05-09 PROCEDURE — 3074F SYST BP LT 130 MM HG: CPT | Performed by: OBSTETRICS & GYNECOLOGY

## 2023-05-09 PROCEDURE — 81002 URINALYSIS NONAUTO W/O SCOPE: CPT | Performed by: OBSTETRICS & GYNECOLOGY

## 2023-05-11 ENCOUNTER — HOSPITAL ENCOUNTER (OUTPATIENT)
Facility: HOSPITAL | Age: 28
Discharge: HOME | End: 2023-05-11
Attending: OBSTETRICS & GYNECOLOGY | Admitting: OBSTETRICS & GYNECOLOGY
Payer: COMMERCIAL

## 2023-05-11 ENCOUNTER — HOSPITAL ENCOUNTER (OUTPATIENT)
Dept: PERINATAL CARE | Facility: HOSPITAL | Age: 28
Discharge: HOME | End: 2023-05-11
Attending: OBSTETRICS & GYNECOLOGY
Payer: COMMERCIAL

## 2023-05-11 VITALS
HEART RATE: 85 BPM | RESPIRATION RATE: 16 BRPM | HEIGHT: 67 IN | SYSTOLIC BLOOD PRESSURE: 114 MMHG | TEMPERATURE: 99 F | WEIGHT: 255 LBS | BODY MASS INDEX: 40.02 KG/M2 | DIASTOLIC BLOOD PRESSURE: 75 MMHG

## 2023-05-11 VITALS
WEIGHT: 255 LBS | BODY MASS INDEX: 40 KG/M2 | HEART RATE: 88 BPM | SYSTOLIC BLOOD PRESSURE: 120 MMHG | DIASTOLIC BLOOD PRESSURE: 77 MMHG

## 2023-05-11 DIAGNOSIS — O99.282 HYPOTHYROIDISM COMPLICATING PREGNANCY, SECOND TRIMESTER: ICD-10-CM

## 2023-05-11 DIAGNOSIS — O24.410 DIET CONTROLLED GESTATIONAL DIABETES MELLITUS (GDM) IN THIRD TRIMESTER: ICD-10-CM

## 2023-05-11 DIAGNOSIS — E66.01 MATERNAL MORBID OBESITY, ANTEPARTUM, THIRD TRIMESTER (HCC): ICD-10-CM

## 2023-05-11 DIAGNOSIS — O34.32 CERVICAL CERCLAGE SUTURE PRESENT IN SECOND TRIMESTER: ICD-10-CM

## 2023-05-11 DIAGNOSIS — O99.213 OBESITY AFFECTING PREGNANCY IN THIRD TRIMESTER: ICD-10-CM

## 2023-05-11 DIAGNOSIS — E03.9 HYPOTHYROIDISM COMPLICATING PREGNANCY, SECOND TRIMESTER: ICD-10-CM

## 2023-05-11 DIAGNOSIS — O99.213 OBESITY AFFECTING PREGNANCY IN THIRD TRIMESTER: Primary | ICD-10-CM

## 2023-05-11 DIAGNOSIS — O36.8390 FETAL TACHYCARDIA AFFECTING MANAGEMENT OF MOTHER: ICD-10-CM

## 2023-05-11 DIAGNOSIS — N88.3 INCOMPETENT CERVIX: ICD-10-CM

## 2023-05-11 DIAGNOSIS — O99.213 MATERNAL MORBID OBESITY, ANTEPARTUM, THIRD TRIMESTER (HCC): ICD-10-CM

## 2023-05-11 PROBLEM — Z34.90 PREGNANCY: Status: ACTIVE | Noted: 2023-05-11

## 2023-05-11 PROBLEM — Z34.90 PREGNANCY (HCC): Status: ACTIVE | Noted: 2023-05-11

## 2023-05-11 LAB — GLUCOSE BLDC GLUCOMTR-MCNC: 132 MG/DL (ref 70–99)

## 2023-05-11 PROCEDURE — 59025 FETAL NON-STRESS TEST: CPT

## 2023-05-11 PROCEDURE — 76819 FETAL BIOPHYS PROFIL W/O NST: CPT

## 2023-05-11 PROCEDURE — 82962 GLUCOSE BLOOD TEST: CPT

## 2023-05-11 PROCEDURE — 76816 OB US FOLLOW-UP PER FETUS: CPT | Performed by: OBSTETRICS & GYNECOLOGY

## 2023-05-11 PROCEDURE — 99213 OFFICE O/P EST LOW 20 MIN: CPT

## 2023-05-11 RX ORDER — CALCIUM CARBONATE 500 MG/1
500 TABLET, CHEWABLE ORAL
Status: DISCONTINUED | OUTPATIENT
Start: 2023-05-11 | End: 2023-05-11

## 2023-05-11 NOTE — PROGRESS NOTES
Pt is a 32year old female admitted to TR2/TR2-A. Patient presents with:  Non-stress Test: Elevated FHT on ultrasound during [facility] visit for cerclage removal     Pt is  36w1d intra-uterine pregnancy. History obtained, consents signed. Oriented to room, staff, and plan of care.

## 2023-05-11 NOTE — PROGRESS NOTES
Report to [facility]. Pt to 375 via ambulatory in stable condition with significant other and all belongings.

## 2023-05-12 NOTE — PROGRESS NOTES
Verbal and written discharge instructions given and discussed. Patient questions answered. Patient escorted out.

## 2023-05-17 ENCOUNTER — LAB ENCOUNTER (OUTPATIENT)
Dept: LAB | Age: 28
End: 2023-05-17
Attending: OBSTETRICS & GYNECOLOGY
Payer: COMMERCIAL

## 2023-05-17 ENCOUNTER — ROUTINE PRENATAL (OUTPATIENT)
Dept: OBGYN CLINIC | Facility: CLINIC | Age: 28
End: 2023-05-17

## 2023-05-17 VITALS — BODY MASS INDEX: 40 KG/M2 | SYSTOLIC BLOOD PRESSURE: 130 MMHG | WEIGHT: 257 LBS | DIASTOLIC BLOOD PRESSURE: 77 MMHG

## 2023-05-17 DIAGNOSIS — E03.9 HYPOTHYROIDISM COMPLICATING PREGNANCY, SECOND TRIMESTER: ICD-10-CM

## 2023-05-17 DIAGNOSIS — O09.819 PREGNANCY RESULTING FROM ASSISTED REPRODUCTIVE TECHNOLOGY, ANTEPARTUM: ICD-10-CM

## 2023-05-17 DIAGNOSIS — O99.213 OBESITY AFFECTING PREGNANCY IN THIRD TRIMESTER: ICD-10-CM

## 2023-05-17 DIAGNOSIS — O99.282 HYPOTHYROIDISM COMPLICATING PREGNANCY, SECOND TRIMESTER: ICD-10-CM

## 2023-05-17 DIAGNOSIS — Z34.83 ENCOUNTER FOR SUPERVISION OF OTHER NORMAL PREGNANCY IN THIRD TRIMESTER: Primary | ICD-10-CM

## 2023-05-17 LAB
APPEARANCE: CLEAR
BILIRUBIN: NEGATIVE
GLUCOSE (URINE DIPSTICK): NEGATIVE MG/DL
KETONES (URINE DIPSTICK): NEGATIVE MG/DL
LEUKOCYTES: NEGATIVE
MULTISTIX LOT#: NORMAL NUMERIC
NITRITE, URINE: NEGATIVE
OCCULT BLOOD: NEGATIVE
PH, URINE: 6.5 (ref 4.5–8)
PROTEIN (URINE DIPSTICK): NEGATIVE MG/DL
SPECIFIC GRAVITY: 1.01 (ref 1–1.03)
TSI SER-ACNC: 1.63 MIU/ML (ref 0.36–3.74)
URINE-COLOR: YELLOW
UROBILINOGEN,SEMI-QN: 0.2 MG/DL (ref 0–1.9)

## 2023-05-17 PROCEDURE — 3078F DIAST BP <80 MM HG: CPT | Performed by: OBSTETRICS & GYNECOLOGY

## 2023-05-17 PROCEDURE — 81002 URINALYSIS NONAUTO W/O SCOPE: CPT | Performed by: OBSTETRICS & GYNECOLOGY

## 2023-05-17 PROCEDURE — 84443 ASSAY THYROID STIM HORMONE: CPT

## 2023-05-17 PROCEDURE — 3075F SYST BP GE 130 - 139MM HG: CPT | Performed by: OBSTETRICS & GYNECOLOGY

## 2023-05-17 PROCEDURE — 36415 COLL VENOUS BLD VENIPUNCTURE: CPT

## 2023-05-18 ENCOUNTER — HOSPITAL ENCOUNTER (OUTPATIENT)
Dept: PERINATAL CARE | Facility: HOSPITAL | Age: 28
Discharge: HOME | End: 2023-05-18
Attending: OBSTETRICS & GYNECOLOGY
Payer: COMMERCIAL

## 2023-05-18 VITALS — SYSTOLIC BLOOD PRESSURE: 124 MMHG | DIASTOLIC BLOOD PRESSURE: 82 MMHG | HEART RATE: 81 BPM

## 2023-05-18 DIAGNOSIS — O09.819 PREGNANCY RESULTING FROM ASSISTED REPRODUCTIVE TECHNOLOGY, ANTEPARTUM: ICD-10-CM

## 2023-05-18 DIAGNOSIS — O24.410 DIET CONTROLLED GESTATIONAL DIABETES MELLITUS (GDM) IN THIRD TRIMESTER: Primary | ICD-10-CM

## 2023-05-18 DIAGNOSIS — O99.213 OBESITY AFFECTING PREGNANCY IN THIRD TRIMESTER: ICD-10-CM

## 2023-05-18 PROCEDURE — 59025 FETAL NON-STRESS TEST: CPT

## 2023-05-19 ENCOUNTER — NST DOCUMENTATION (OUTPATIENT)
Dept: OBGYN CLINIC | Facility: CLINIC | Age: 28
End: 2023-05-19

## 2023-05-19 LAB — GROUP B STREP BY PCR FOR PCR OVT: NEGATIVE

## 2023-05-23 ENCOUNTER — NST DOCUMENTATION (OUTPATIENT)
Dept: OBGYN CLINIC | Facility: CLINIC | Age: 28
End: 2023-05-23

## 2023-05-24 ENCOUNTER — NST DOCUMENTATION (OUTPATIENT)
Dept: OBGYN CLINIC | Facility: CLINIC | Age: 28
End: 2023-05-24

## 2023-05-24 DIAGNOSIS — O24.410 DIET CONTROLLED GESTATIONAL DIABETES MELLITUS (GDM) IN THIRD TRIMESTER: ICD-10-CM

## 2023-05-24 DIAGNOSIS — O09.819 PREGNANCY RESULTING FROM ASSISTED REPRODUCTIVE TECHNOLOGY, ANTEPARTUM: ICD-10-CM

## 2023-05-24 DIAGNOSIS — O99.213 OBESITY AFFECTING PREGNANCY IN THIRD TRIMESTER: ICD-10-CM

## 2023-05-24 PROCEDURE — 59025 FETAL NON-STRESS TEST: CPT | Performed by: OBSTETRICS & GYNECOLOGY

## 2023-05-24 NOTE — NST
Nonstress Test   Patient: Lissa Martin    Gestation: 37w1d    Diagnosis from order:         NST:        2023   NST DOCUMENTATION   Variability 6-25 BPM   Accelerations Yes   Decelerations None   Baseline 135 BPM   Uterine Irritability No   Contractions Not present   Acoustic Stimulator No   Nonstress Test Interpretation Reactive   NST Completed by Emerald Siegel RN   Disposition Home    Testing Plan Weekly NST   Provider Notified Beto Mcgee MD        Physician Evaluation      NST Interpretation: Reactive  Fetal Surveillance:  130's BPM; Fetal heart variability: moderate  Fetal Heart Rate decelerations: none  Fetal Heart Rate accelerations: yes    Disposition:   Discharged    Comments:  A: 32 y.o.   with Estiated Date of Delivery: 23 and IUP at 42w4d for NST due to GDM-A1, maternal obesity, hypothyroidism in pregnancy. Late entry. NST was reviewed on 23 but note not written until 23.     Asya Cotton MD, MD  2023  6:05 PM

## 2023-05-25 ENCOUNTER — TELEPHONE (OUTPATIENT)
Dept: OBGYN CLINIC | Facility: CLINIC | Age: 28
End: 2023-05-25

## 2023-05-25 ENCOUNTER — ROUTINE PRENATAL (OUTPATIENT)
Dept: OBGYN CLINIC | Facility: CLINIC | Age: 28
End: 2023-05-25

## 2023-05-25 ENCOUNTER — HOSPITAL ENCOUNTER (OUTPATIENT)
Dept: PERINATAL CARE | Facility: HOSPITAL | Age: 28
Discharge: HOME | End: 2023-05-25
Attending: OBSTETRICS & GYNECOLOGY
Payer: COMMERCIAL

## 2023-05-25 VITALS
WEIGHT: 258.81 LBS | BODY MASS INDEX: 41 KG/M2 | DIASTOLIC BLOOD PRESSURE: 86 MMHG | HEART RATE: 106 BPM | SYSTOLIC BLOOD PRESSURE: 132 MMHG

## 2023-05-25 DIAGNOSIS — O24.410 DIET CONTROLLED GESTATIONAL DIABETES MELLITUS (GDM) IN THIRD TRIMESTER: Primary | ICD-10-CM

## 2023-05-25 DIAGNOSIS — O09.819 PREGNANCY RESULTING FROM ASSISTED REPRODUCTIVE TECHNOLOGY, ANTEPARTUM: ICD-10-CM

## 2023-05-25 DIAGNOSIS — O99.213 OBESITY AFFECTING PREGNANCY IN THIRD TRIMESTER: ICD-10-CM

## 2023-05-25 DIAGNOSIS — Z34.83 ENCOUNTER FOR SUPERVISION OF OTHER NORMAL PREGNANCY IN THIRD TRIMESTER: Primary | ICD-10-CM

## 2023-05-25 LAB
APPEARANCE: CLEAR
BILIRUBIN: NEGATIVE
GLUCOSE (URINE DIPSTICK): NEGATIVE MG/DL
KETONES (URINE DIPSTICK): NEGATIVE MG/DL
MULTISTIX LOT#: ABNORMAL NUMERIC
NITRITE, URINE: NEGATIVE
OCCULT BLOOD: NEGATIVE
PH, URINE: 6.5 (ref 4.5–8)
PROTEIN (URINE DIPSTICK): NEGATIVE MG/DL
SPECIFIC GRAVITY: 1.01 (ref 1–1.03)
URINE-COLOR: YELLOW
UROBILINOGEN,SEMI-QN: 0.2 MG/DL (ref 0–1.9)

## 2023-05-25 PROCEDURE — 3079F DIAST BP 80-89 MM HG: CPT | Performed by: OBSTETRICS & GYNECOLOGY

## 2023-05-25 PROCEDURE — 3075F SYST BP GE 130 - 139MM HG: CPT | Performed by: OBSTETRICS & GYNECOLOGY

## 2023-05-25 PROCEDURE — 81002 URINALYSIS NONAUTO W/O SCOPE: CPT | Performed by: OBSTETRICS & GYNECOLOGY

## 2023-05-25 PROCEDURE — 59025 FETAL NON-STRESS TEST: CPT

## 2023-05-25 NOTE — TELEPHONE ENCOUNTER
Received verbal order from [facility] to schedule pt for AM IOL on Wednesday, 06/07/2023. [facility]

## 2023-05-25 NOTE — PROGRESS NOTES
No C/Os. Labor Precautions reviewed. Continue with weekly NSTs. A1 DM controlled. IOL Wednesday, 6/7/2023 being scheduled by office RN.

## 2023-05-26 ENCOUNTER — NST DOCUMENTATION (OUTPATIENT)
Dept: OBGYN CLINIC | Facility: CLINIC | Age: 28
End: 2023-05-26

## 2023-05-26 DIAGNOSIS — O24.410 DIET CONTROLLED GESTATIONAL DIABETES MELLITUS (GDM) IN THIRD TRIMESTER: ICD-10-CM

## 2023-05-26 DIAGNOSIS — O99.213 OBESITY AFFECTING PREGNANCY IN THIRD TRIMESTER: ICD-10-CM

## 2023-05-26 PROCEDURE — 59025 FETAL NON-STRESS TEST: CPT | Performed by: OBSTETRICS & GYNECOLOGY

## 2023-05-26 NOTE — NST
Nonstress Test   Patient: Adan Morgan    Gestation: 38w2d    Diagnosis from order:         NST:        2023   NST DOCUMENTATION   Variability 6-25 BPM   Accelerations Yes   Decelerations None   Baseline 135 BPM   Uterine Irritability No   Contractions Not present   Acoustic Stimulator No   Nonstress Test Interpretation Reactive   NST Completed by Ana LEE   Disposition Home    Testing Plan Weekly NST   Provider Notified Suri Lyon MD        Physician Evaluation      NST Interpretation: Reactive  Fetal Surveillance:  130's BPM; Fetal heart variability: moderate  Fetal Heart Rate decelerations: none  Fetal Heart Rate accelerations: yes    Disposition:   Discharged    Comments:  A: 32 y.o.   with Estimated Date of Delivery: 23 and IUP at 38w1d for NST due to GDM-A1, maternal obesity, hypothyroidism in pregnancy. NST is reactive.     Anurag Loja MD, MD  2023  3:51 PM

## 2023-05-28 ENCOUNTER — NST DOCUMENTATION (OUTPATIENT)
Dept: OBGYN CLINIC | Facility: CLINIC | Age: 28
End: 2023-05-28

## 2023-05-31 ENCOUNTER — ROUTINE PRENATAL (OUTPATIENT)
Dept: OBGYN CLINIC | Facility: CLINIC | Age: 28
End: 2023-05-31

## 2023-05-31 VITALS — BODY MASS INDEX: 41 KG/M2 | WEIGHT: 260 LBS | DIASTOLIC BLOOD PRESSURE: 80 MMHG | SYSTOLIC BLOOD PRESSURE: 121 MMHG

## 2023-05-31 DIAGNOSIS — O09.819 PREGNANCY RESULTING FROM ASSISTED REPRODUCTIVE TECHNOLOGY, ANTEPARTUM: ICD-10-CM

## 2023-05-31 DIAGNOSIS — Z34.83 ENCOUNTER FOR SUPERVISION OF OTHER NORMAL PREGNANCY IN THIRD TRIMESTER: ICD-10-CM

## 2023-05-31 DIAGNOSIS — O99.213 OBESITY AFFECTING PREGNANCY IN THIRD TRIMESTER: ICD-10-CM

## 2023-05-31 DIAGNOSIS — O24.410 DIET CONTROLLED GESTATIONAL DIABETES MELLITUS (GDM) IN THIRD TRIMESTER: Primary | ICD-10-CM

## 2023-05-31 LAB
APPEARANCE: CLEAR
BILIRUBIN: NEGATIVE
GLUCOSE (URINE DIPSTICK): NEGATIVE MG/DL
KETONES (URINE DIPSTICK): NEGATIVE MG/DL
MULTISTIX LOT#: ABNORMAL NUMERIC
NITRITE, URINE: NEGATIVE
OCCULT BLOOD: NEGATIVE
PH, URINE: 6 (ref 4.5–8)
PROTEIN (URINE DIPSTICK): NEGATIVE MG/DL
SPECIFIC GRAVITY: 1.02 (ref 1–1.03)
URINE-COLOR: YELLOW
UROBILINOGEN,SEMI-QN: 0.2 MG/DL (ref 0–1.9)

## 2023-05-31 PROCEDURE — 81002 URINALYSIS NONAUTO W/O SCOPE: CPT | Performed by: OBSTETRICS & GYNECOLOGY

## 2023-05-31 PROCEDURE — 3079F DIAST BP 80-89 MM HG: CPT | Performed by: OBSTETRICS & GYNECOLOGY

## 2023-05-31 PROCEDURE — 3074F SYST BP LT 130 MM HG: CPT | Performed by: OBSTETRICS & GYNECOLOGY

## 2023-06-01 ENCOUNTER — HOSPITAL ENCOUNTER (OUTPATIENT)
Dept: PERINATAL CARE | Facility: HOSPITAL | Age: 28
Discharge: HOME | End: 2023-06-01
Attending: OBSTETRICS & GYNECOLOGY
Payer: COMMERCIAL

## 2023-06-01 ENCOUNTER — NST DOCUMENTATION (OUTPATIENT)
Dept: OBGYN CLINIC | Facility: CLINIC | Age: 28
End: 2023-06-01

## 2023-06-01 VITALS — DIASTOLIC BLOOD PRESSURE: 82 MMHG | SYSTOLIC BLOOD PRESSURE: 119 MMHG

## 2023-06-01 DIAGNOSIS — O99.213 OBESITY AFFECTING PREGNANCY IN THIRD TRIMESTER: Primary | ICD-10-CM

## 2023-06-01 DIAGNOSIS — O24.410 DIET CONTROLLED GESTATIONAL DIABETES MELLITUS (GDM) IN THIRD TRIMESTER: ICD-10-CM

## 2023-06-01 DIAGNOSIS — O99.282 HYPOTHYROIDISM COMPLICATING PREGNANCY, SECOND TRIMESTER: ICD-10-CM

## 2023-06-01 DIAGNOSIS — E03.9 HYPOTHYROIDISM COMPLICATING PREGNANCY, SECOND TRIMESTER: ICD-10-CM

## 2023-06-01 PROCEDURE — 59025 FETAL NON-STRESS TEST: CPT | Performed by: OBSTETRICS & GYNECOLOGY

## 2023-06-01 PROCEDURE — 59025 FETAL NON-STRESS TEST: CPT

## 2023-06-01 NOTE — NST
Nonstress Test   Patient: Steve Plascencia    Gestation: 39w1d    Diagnosis from order:         NST:        2023   NST DOCUMENTATION   Variability 6-25 BPM   Accelerations Yes   Decelerations None   Baseline 130 BPM   Uterine Irritability No   Contractions Not present   Acoustic Stimulator No   Nonstress Test Interpretation Reactive   Nonstress Test Second Interpretation Reactive   FHR Category Category I   NST Completed by Go Chna RN   Disposition home    Testing Plan Weekly NST   Provider Notified Fitzer      Physician Evaluation      NST Interpretation: Reactive  Fetal Surveillance:  120's BPM; Fetal heart variability: moderate  Fetal Heart Rate decelerations: none  Fetal Heart Rate accelerations: yes    Disposition:   Discharged    Comments:  A: 32 y.o.   with Estimated Date of Delivery: 23 and IUP at 39w1d for NST due to GDM-A1, obesity and hypothyroidism in pregnancy. NST is reactive.     Kayden Peraza MD, MD  2023  6:39 PM

## 2023-06-02 ENCOUNTER — HOSPITAL ENCOUNTER (INPATIENT)
Facility: HOSPITAL | Age: 28
LOS: 2 days | Discharge: HOME | End: 2023-06-04
Attending: OBSTETRICS & GYNECOLOGY | Admitting: OBSTETRICS & GYNECOLOGY
Payer: COMMERCIAL

## 2023-06-02 LAB
ANTIBODY SCREEN: NEGATIVE
BASOPHILS # BLD AUTO: 0.04 X10(3) UL (ref 0–0.2)
BASOPHILS NFR BLD AUTO: 0.3 %
DEPRECATED RDW RBC AUTO: 40.6 FL (ref 35.1–46.3)
EOSINOPHIL # BLD AUTO: 0.08 X10(3) UL (ref 0–0.7)
EOSINOPHIL NFR BLD AUTO: 0.6 %
ERYTHROCYTE [DISTWIDTH] IN BLOOD BY AUTOMATED COUNT: 12.5 % (ref 11–15)
GLUCOSE BLDC GLUCOMTR-MCNC: 102 MG/DL (ref 70–99)
GLUCOSE BLDC GLUCOMTR-MCNC: 73 MG/DL (ref 70–99)
GLUCOSE BLDC GLUCOMTR-MCNC: 83 MG/DL (ref 70–99)
HCT VFR BLD AUTO: 39.4 %
HGB BLD-MCNC: 13.6 G/DL
IMM GRANULOCYTES # BLD AUTO: 0.06 X10(3) UL (ref 0–1)
IMM GRANULOCYTES NFR BLD: 0.5 %
LYMPHOCYTES # BLD AUTO: 2.39 X10(3) UL (ref 1–4)
LYMPHOCYTES NFR BLD AUTO: 19.2 %
MCH RBC QN AUTO: 30.8 PG (ref 26–34)
MCHC RBC AUTO-ENTMCNC: 34.5 G/DL (ref 31–37)
MCV RBC AUTO: 89.1 FL
MONOCYTES # BLD AUTO: 0.82 X10(3) UL (ref 0.1–1)
MONOCYTES NFR BLD AUTO: 6.6 %
NEUTROPHILS # BLD AUTO: 9.09 X10 (3) UL (ref 1.5–7.7)
NEUTROPHILS # BLD AUTO: 9.09 X10(3) UL (ref 1.5–7.7)
NEUTROPHILS NFR BLD AUTO: 72.8 %
PLATELET # BLD AUTO: 266 10(3)UL (ref 150–450)
RBC # BLD AUTO: 4.42 X10(6)UL
RH BLOOD TYPE: POSITIVE
RH BLOOD TYPE: POSITIVE
WBC # BLD AUTO: 12.5 X10(3) UL (ref 4–11)

## 2023-06-02 PROCEDURE — 0KQM0ZZ REPAIR PERINEUM MUSCLE, OPEN APPROACH: ICD-10-PCS | Performed by: OBSTETRICS & GYNECOLOGY

## 2023-06-02 PROCEDURE — 59410 OBSTETRICAL CARE: CPT | Performed by: OBSTETRICS & GYNECOLOGY

## 2023-06-02 RX ORDER — TERBUTALINE SULFATE 1 MG/ML
0.25 INJECTION, SOLUTION SUBCUTANEOUS AS NEEDED
Status: DISCONTINUED | OUTPATIENT
Start: 2023-06-02 | End: 2023-06-03 | Stop reason: HOSPADM

## 2023-06-02 RX ORDER — ACETAMINOPHEN 500 MG
1000 TABLET ORAL EVERY 6 HOURS PRN
Status: DISCONTINUED | OUTPATIENT
Start: 2023-06-02 | End: 2023-06-03 | Stop reason: HOSPADM

## 2023-06-02 RX ORDER — NALBUPHINE HYDROCHLORIDE 10 MG/ML
2.5 INJECTION, SOLUTION INTRAMUSCULAR; INTRAVENOUS; SUBCUTANEOUS
Status: DISCONTINUED | OUTPATIENT
Start: 2023-06-02 | End: 2023-06-04

## 2023-06-02 RX ORDER — IBUPROFEN 600 MG/1
600 TABLET ORAL ONCE AS NEEDED
Status: COMPLETED | OUTPATIENT
Start: 2023-06-02 | End: 2023-06-03

## 2023-06-02 RX ORDER — ONDANSETRON 2 MG/ML
4 INJECTION INTRAMUSCULAR; INTRAVENOUS EVERY 6 HOURS PRN
Status: DISCONTINUED | OUTPATIENT
Start: 2023-06-02 | End: 2023-06-03 | Stop reason: HOSPADM

## 2023-06-02 RX ORDER — SODIUM CHLORIDE, SODIUM LACTATE, POTASSIUM CHLORIDE, CALCIUM CHLORIDE 600; 310; 30; 20 MG/100ML; MG/100ML; MG/100ML; MG/100ML
INJECTION, SOLUTION INTRAVENOUS AS NEEDED
Status: DISCONTINUED | OUTPATIENT
Start: 2023-06-02 | End: 2023-06-03 | Stop reason: HOSPADM

## 2023-06-02 RX ORDER — BUPIVACAINE HYDROCHLORIDE 2.5 MG/ML
20 INJECTION, SOLUTION EPIDURAL; INFILTRATION; INTRACAUDAL ONCE
Status: DISCONTINUED | OUTPATIENT
Start: 2023-06-02 | End: 2023-06-03 | Stop reason: HOSPADM

## 2023-06-02 RX ORDER — TRISODIUM CITRATE DIHYDRATE AND CITRIC ACID MONOHYDRATE 500; 334 MG/5ML; MG/5ML
30 SOLUTION ORAL AS NEEDED
Status: DISCONTINUED | OUTPATIENT
Start: 2023-06-02 | End: 2023-06-03 | Stop reason: HOSPADM

## 2023-06-02 RX ORDER — BUPIVACAINE HCL/0.9 % NACL/PF 0.25 %
5 PLASTIC BAG, INJECTION (ML) EPIDURAL AS NEEDED
Status: DISCONTINUED | OUTPATIENT
Start: 2023-06-02 | End: 2023-06-04

## 2023-06-02 RX ORDER — DEXTROSE, SODIUM CHLORIDE, SODIUM LACTATE, POTASSIUM CHLORIDE, AND CALCIUM CHLORIDE 5; .6; .31; .03; .02 G/100ML; G/100ML; G/100ML; G/100ML; G/100ML
INJECTION, SOLUTION INTRAVENOUS CONTINUOUS
Status: DISCONTINUED | OUTPATIENT
Start: 2023-06-02 | End: 2023-06-03 | Stop reason: HOSPADM

## 2023-06-02 RX ORDER — ACETAMINOPHEN 500 MG
500 TABLET ORAL EVERY 6 HOURS PRN
Status: DISCONTINUED | OUTPATIENT
Start: 2023-06-02 | End: 2023-06-03

## 2023-06-02 RX ORDER — LIDOCAINE HYDROCHLORIDE 10 MG/ML
30 INJECTION, SOLUTION EPIDURAL; INFILTRATION; INTRACAUDAL; PERINEURAL ONCE
Status: DISCONTINUED | OUTPATIENT
Start: 2023-06-02 | End: 2023-06-03 | Stop reason: HOSPADM

## 2023-06-03 LAB — GLUCOSE BLDC GLUCOMTR-MCNC: 70 MG/DL (ref 70–99)

## 2023-06-03 RX ORDER — CHOLECALCIFEROL (VITAMIN D3) 25 MCG
1 TABLET,CHEWABLE ORAL DAILY
OUTPATIENT
Start: 2023-06-03

## 2023-06-03 RX ORDER — AMMONIA INHALANTS 0.04 G/.3ML
0.3 INHALANT RESPIRATORY (INHALATION) AS NEEDED
Status: DISCONTINUED | OUTPATIENT
Start: 2023-06-03 | End: 2023-06-04

## 2023-06-03 RX ORDER — ACETAMINOPHEN 500 MG
500 TABLET ORAL EVERY 6 HOURS PRN
Status: DISCONTINUED | OUTPATIENT
Start: 2023-06-03 | End: 2023-06-04

## 2023-06-03 RX ORDER — DOCUSATE SODIUM 100 MG/1
100 CAPSULE, LIQUID FILLED ORAL
Status: DISCONTINUED | OUTPATIENT
Start: 2023-06-03 | End: 2023-06-04

## 2023-06-03 RX ORDER — DIAPER,BRIEF,INFANT-TODD,DISP
1 EACH MISCELLANEOUS EVERY 6 HOURS PRN
Status: DISCONTINUED | OUTPATIENT
Start: 2023-06-03 | End: 2023-06-04

## 2023-06-03 RX ORDER — IBUPROFEN 600 MG/1
600 TABLET ORAL EVERY 6 HOURS
Status: DISCONTINUED | OUTPATIENT
Start: 2023-06-03 | End: 2023-06-04

## 2023-06-03 RX ORDER — SIMETHICONE 80 MG
80 TABLET,CHEWABLE ORAL 3 TIMES DAILY PRN
Status: DISCONTINUED | OUTPATIENT
Start: 2023-06-03 | End: 2023-06-04

## 2023-06-03 RX ORDER — ONDANSETRON 2 MG/ML
4 INJECTION INTRAMUSCULAR; INTRAVENOUS EVERY 6 HOURS PRN
Status: DISCONTINUED | OUTPATIENT
Start: 2023-06-03 | End: 2023-06-04

## 2023-06-03 RX ORDER — ACETAMINOPHEN 500 MG
1000 TABLET ORAL EVERY 6 HOURS PRN
Status: DISCONTINUED | OUTPATIENT
Start: 2023-06-03 | End: 2023-06-04

## 2023-06-03 RX ORDER — LEVOTHYROXINE SODIUM 0.1 MG/1
100 TABLET ORAL
Status: DISCONTINUED | OUTPATIENT
Start: 2023-06-03 | End: 2023-06-04

## 2023-06-03 RX ORDER — BISACODYL 10 MG
10 SUPPOSITORY, RECTAL RECTAL ONCE AS NEEDED
Status: DISCONTINUED | OUTPATIENT
Start: 2023-06-03 | End: 2023-06-04

## 2023-06-03 NOTE — L&D DELIVERY NOTE
[facility]    Vaginal Delivery Note    Lavinia Fairchild Patient Status:  Inpatient    1995 MRN [id number]   Location [address] Attending Jazmin Landry MD   Hosp Day # 1 PCP No primary care provider on file. Delivery     Infant  Date of Delivery: 2023   Time of Delivery: 11:22 PM  Delivery Type: Normal spontaneous vaginal delivery    Infant Sex/Birthweight: male No birth weight on file. Presentation Vertex [1]  Position Left [1] Occiput [1] Anterior [1]    Apgars:  1 minute: 8               5 minutes: 9                        10 minutes:      Placenta  Date/Time of Delivery:     Delivery: spontaneous  Placenta to Pathology: no  Cord Gases Submitted: no  Cord Blood Collection: no  Cord Tissue Collection: no  Cord Complications: none  Sponge and Needle Counts:  Verified yes    Maternal Anesthesia: local   Episiotomy/Laceration Repair  Episiotomy: none  Laceration: perineal 2nd degree  Repaired with 2-0 Chromic    Delivery Complications  none    Neonatologist Present: no  Delivery Comment: Mom and baby doing well. Intake/Output   QBL:  Pending      David Anderson MD   6/3/2023  12:14 AM

## 2023-06-03 NOTE — PROGRESS NOTES
Patient up to bathroom with assist x 2. Voided 275. Patient transferred to mother/baby room 357 per wheelchair in stable condition with baby and personal belongings. Accompanied by significant other and staff. Report given to mother/baby RN. no

## 2023-06-03 NOTE — LACTATION NOTE
LACTATION NOTE - MOTHER      Evaluation Type: Inpatient    Problems identified  Problems identified: Knowledge deficit; Nipple pain  Problems Identified Other: right nipple blister/pain    Maternal history  Maternal history: Anxiety;Depression;Gestational diabetes; Hypothyroid; Polycystic ovarian syndrome (PCOS);Obesity  Other/comment: SROM    Breastfeeding goal  Breastfeeding goal: To maintain breast milk feeding per patient goal    Maternal Assessment  Bilateral Breasts: Soft;Symmetrical (large/pendulous)  Right Nipple: Thick/dense;Blister  Right Areola: Edema  Left Nipple: Slightly everted/short;Elastic;Colostrum easily expressed  Prior breastfeeding experience (comment below): Primip  Breastfeeding Assistance: Breastfeeding assistance provided with permission    Pain assessment  Pain, additional: Pain location  Pain Location: Nipples  Location/Comment: right nipple  Treatment of Sore Nipples: Expressed breast milk;Deeper latch techniques; Lanolin    Guidelines for use of:  Current use of pump[de-identified] Not currently indicated  Suggested use of pump: For comfort as needed;Pump if infant is not latching to breast;Pump each time a supplement is offered  Other (comment): Reviewed continued lactation support IP and OP, encouraged parent led feeding and deep latch techniques, nipple blister healing/prevention and signs/symptoms of unresolved nipple blister

## 2023-06-04 VITALS
SYSTOLIC BLOOD PRESSURE: 118 MMHG | RESPIRATION RATE: 16 BRPM | TEMPERATURE: 98 F | HEART RATE: 74 BPM | DIASTOLIC BLOOD PRESSURE: 78 MMHG

## 2023-06-04 LAB
BASOPHILS # BLD AUTO: 0.06 X10(3) UL (ref 0–0.2)
BASOPHILS NFR BLD AUTO: 0.5 %
DEPRECATED RDW RBC AUTO: 43.4 FL (ref 35.1–46.3)
EOSINOPHIL # BLD AUTO: 0.17 X10(3) UL (ref 0–0.7)
EOSINOPHIL NFR BLD AUTO: 1.4 %
ERYTHROCYTE [DISTWIDTH] IN BLOOD BY AUTOMATED COUNT: 12.8 % (ref 11–15)
HCT VFR BLD AUTO: 33.4 %
HGB BLD-MCNC: 11.3 G/DL
IMM GRANULOCYTES # BLD AUTO: 0.04 X10(3) UL (ref 0–1)
IMM GRANULOCYTES NFR BLD: 0.3 %
LYMPHOCYTES # BLD AUTO: 3.06 X10(3) UL (ref 1–4)
LYMPHOCYTES NFR BLD AUTO: 25.6 %
MCH RBC QN AUTO: 31.5 PG (ref 26–34)
MCHC RBC AUTO-ENTMCNC: 33.8 G/DL (ref 31–37)
MCV RBC AUTO: 93 FL
MONOCYTES # BLD AUTO: 0.73 X10(3) UL (ref 0.1–1)
MONOCYTES NFR BLD AUTO: 6.1 %
NEUTROPHILS # BLD AUTO: 7.87 X10 (3) UL (ref 1.5–7.7)
NEUTROPHILS # BLD AUTO: 7.87 X10(3) UL (ref 1.5–7.7)
NEUTROPHILS NFR BLD AUTO: 66.1 %
PLATELET # BLD AUTO: 213 10(3)UL (ref 150–450)
RBC # BLD AUTO: 3.59 X10(6)UL
WBC # BLD AUTO: 11.9 X10(3) UL (ref 4–11)

## 2023-06-04 NOTE — PLAN OF CARE
Problem: PAIN - ADULT  Goal: Verbalizes/displays adequate comfort level or patient's stated pain goal  Description: INTERVENTIONS:  - Encourage pt to monitor pain and request assistance  - Assess pain using appropriate pain scale  - Administer analgesics based on type and severity of pain and evaluate response  - Implement non-pharmacological measures as appropriate and evaluate response  - Consider cultural and social influences on pain and pain management  - Manage/alleviate anxiety  - Utilize distraction and/or relaxation techniques  - Monitor for opioid side effects  - Notify MD/LIP if interventions unsuccessful or patient reports new pain  - Anticipate increased pain with activity and pre-medicate as appropriate  Outcome: Progressing     Problem: ANXIETY  Goal: Will report anxiety at manageable levels  Description: INTERVENTIONS:  - Administer medication as ordered  - Teach and rehearse alternative coping skills  - Provide emotional support with 1:1 interaction with staff  Outcome: Progressing     Problem: POSTPARTUM  Goal: Long Term Goal:Experiences normal postpartum course  Description: INTERVENTIONS:  - Assess and monitor vital signs and lab values. - Assess fundus and lochia. - Provide ice/sitz baths for perineum discomfort. - Monitor healing of incision/episiotomy/laceration, and assess for signs and symptoms of infection and hematoma. - Assess bladder function and monitor for bladder distention.  - Provide/instruct/assist with pericare as needed. - Provide VTE prophylaxis as needed. - Monitor bowel function.  - Encourage ambulation and provide assistance as needed. - Assess and monitor emotional status and provide social service/psych resources as needed. - Utilize standard precautions and use personal protective equipment as indicated. Ensure aseptic care of all intravenous lines and invasive tubes/drains.  - Obtain immunization and exposure to communicable diseases history.   Outcome: Progressing  Goal: Optimize infant feeding at the breast  Description: INTERVENTIONS:  - Initiate breast feeding within first hour after birth. - Monitor effectiveness of current breast feeding efforts. - Assess support systems available to mother/family.  - Identify cultural beliefs/practices regarding lactation, letdown techniques, maternal food preferences. - Assess mother's knowledge and previous experience with breast feeding.  - Provide information as needed about early infant feeding cues (e.g., rooting, lip smacking, sucking fingers/hand) versus late cue of crying.  - Discuss/demonstrate breast feeding aids (e.g., infant sling, nursing footstool/pillows, and breast pumps). - Encourage mother/other family members to express feelings/concerns, and actively listen. - Educate father/SO about benefits of breast feeding and how to manage common lactation challenges. - Recommend avoidance of specific medications or substances incompatible with breast feeding.  - Assess and monitor for signs of nipple pain/trauma. - Instruct and provide assistance with proper latch. - Review techniques for milk expression (breast pumping) and storage of breast milk. Provide pumping equipment/supplies, instructions and assistance, as needed. - Encourage rooming-in and breast feeding on demand.  - Encourage skin-to-skin contact. - Provide LC support as needed. - Assess for and manage engorgement. - Provide breast feeding education handouts and information on community breast feeding support. Outcome: Progressing  Goal: Establishment of adequate milk supply with medication/procedure interruptions  Description: INTERVENTIONS:  - Review techniques for milk expression (breast pumping). - Provide pumping equipment/supplies, instructions, and assistance until it is safe to breastfeed infant.   Outcome: Progressing  Goal: Experiences normal breast weaning course  Description: INTERVENTIONS:  - Assess for and manage engorgement. - Instruct on breast care. - Provide comfort measures. Outcome: Progressing  Goal: Appropriate maternal -  bonding  Description: INTERVENTIONS:  - Assess caregiver- interactions. - Assess caregiver's emotional status and coping mechanisms. - Encourage caregiver to participate in  daily care. - Assess support systems available to mother/family.  - Provide /case management support as needed.   Outcome: Progressing

## 2023-06-04 NOTE — DISCHARGE INSTRUCTIONS
Follow up with OB doctor in 6 weeks, or as directed by physician. Pelvic rest for 6 weeks. Call your OB doctor if you experience:    Heavy bleeding  Foul smelling discharge  Fever of 100.4 or above  Increased swelling  Severe headache and/or vision changes  Calf pain or tenderness  Changes in mood or depression .

## 2023-06-04 NOTE — CM/SW NOTE
Received MDO for SDOH- access to care. Met with patient and spouse at bedside. Patient and spouse moved from [place] back in February. Patient is not working but spouse works full time. This is their first baby, patient confirms she has all baby essentials. Patient followed regularly w/ OB team since she moved. She plans to f/u with [address] for the baby. Patient's family lives down [place] but reports she has support from local friends. Her mom will be flying in and staying with them temporarily. 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 Patient confirms a h/o depression, she is on Wellbutrin and sees a therapist regularly. Patient aware of PPD symptoms and aware she is high risk. She and her therapist have developed a 4th trimester plan. Patient confirms she did not establish a PCP since moving. She states she was seeing her OB team regularly and it was not a priority during pregnancy. Patient plans to f/u & establish care soon. Patient denied any concerns or needs, general resources left w/ patient. SW available as needed, updated Alma LEE.     Rikki Sanchez, [address]

## 2023-06-04 NOTE — PROGRESS NOTES
PPD 1    Pt doing well  No c/o. Alert and oriented, nad  abd soft nontender fundus firm  Ext nt    A/p PPD 1  Pt doing well. No c/o.

## 2023-06-04 NOTE — PLAN OF CARE
Problem: PAIN - ADULT  Goal: Verbalizes/displays adequate comfort level or patient's stated pain goal  Description: INTERVENTIONS:  - Encourage pt to monitor pain and request assistance  - Assess pain using appropriate pain scale  - Administer analgesics based on type and severity of pain and evaluate response  - Implement non-pharmacological measures as appropriate and evaluate response  - Consider cultural and social influences on pain and pain management  - Manage/alleviate anxiety  - Utilize distraction and/or relaxation techniques  - Monitor for opioid side effects  - Notify MD/LIP if interventions unsuccessful or patient reports new pain  - Anticipate increased pain with activity and pre-medicate as appropriate  Outcome: Completed     Problem: ANXIETY  Goal: Will report anxiety at manageable levels  Description: INTERVENTIONS:  - Administer medication as ordered  - Teach and rehearse alternative coping skills  - Provide emotional support with 1:1 interaction with staff  Outcome: Completed     Problem: POSTPARTUM  Goal: Long Term Goal:Experiences normal postpartum course  Description: INTERVENTIONS:  - Assess and monitor vital signs and lab values. - Assess fundus and lochia. - Provide ice/sitz baths for perineum discomfort. - Monitor healing of incision/episiotomy/laceration, and assess for signs and symptoms of infection and hematoma. - Assess bladder function and monitor for bladder distention.  - Provide/instruct/assist with pericare as needed. - Provide VTE prophylaxis as needed. - Monitor bowel function.  - Encourage ambulation and provide assistance as needed. - Assess and monitor emotional status and provide social service/psych resources as needed. - Utilize standard precautions and use personal protective equipment as indicated. Ensure aseptic care of all intravenous lines and invasive tubes/drains.  - Obtain immunization and exposure to communicable diseases history.   Outcome: Completed  Goal: Optimize infant feeding at the breast  Description: INTERVENTIONS:  - Initiate breast feeding within first hour after birth. - Monitor effectiveness of current breast feeding efforts. - Assess support systems available to mother/family.  - Identify cultural beliefs/practices regarding lactation, letdown techniques, maternal food preferences. - Assess mother's knowledge and previous experience with breast feeding.  - Provide information as needed about early infant feeding cues (e.g., rooting, lip smacking, sucking fingers/hand) versus late cue of crying.  - Discuss/demonstrate breast feeding aids (e.g., infant sling, nursing footstool/pillows, and breast pumps). - Encourage mother/other family members to express feelings/concerns, and actively listen. - Educate father/SO about benefits of breast feeding and how to manage common lactation challenges. - Recommend avoidance of specific medications or substances incompatible with breast feeding.  - Assess and monitor for signs of nipple pain/trauma. - Instruct and provide assistance with proper latch. - Review techniques for milk expression (breast pumping) and storage of breast milk. Provide pumping equipment/supplies, instructions and assistance, as needed. - Encourage rooming-in and breast feeding on demand.  - Encourage skin-to-skin contact. - Provide LC support as needed. - Assess for and manage engorgement. - Provide breast feeding education handouts and information on community breast feeding support. Outcome: Completed  Goal: Establishment of adequate milk supply with medication/procedure interruptions  Description: INTERVENTIONS:  - Review techniques for milk expression (breast pumping). - Provide pumping equipment/supplies, instructions, and assistance until it is safe to breastfeed infant. Outcome: Completed  Goal: Experiences normal breast weaning course  Description: INTERVENTIONS:  - Assess for and manage engorgement.   - Instruct on breast care. - Provide comfort measures. Outcome: Completed  Goal: Appropriate maternal -  bonding  Description: INTERVENTIONS:  - Assess caregiver- interactions. - Assess caregiver's emotional status and coping mechanisms. - Encourage caregiver to participate in  daily care. - Assess support systems available to mother/family.  - Provide /case management support as needed.   Outcome: Completed

## 2023-06-05 ENCOUNTER — PATIENT OUTREACH (OUTPATIENT)
Dept: CASE MANAGEMENT | Age: 28
End: 2023-06-05

## 2023-06-05 NOTE — PROGRESS NOTES
1st attempt OBGYN Post Partum apt request  (delivered 06/02)    Dr Corry Wang  [address]  [phone]  Follow up 3 weeks  LVM to call [phone] to assist w/scheduling apt; will try again

## 2023-06-06 NOTE — PROGRESS NOTES
2nd attempt OBGYN Post Partum apt request  (delivered 06/02)    Dr Shayy Carmona  [address]  [phone]  Follow up 3 weeks  LVM to call [phone] to assist w/scheduling apt; will try again [FreeTextEntry1] : I have had the opportunity to see your patient, CAROL TURNER, in follow up. \par Identification: 11 year boy \par Diagnosis(es): Primary headache disorder - migraine without aura. Autism spectrum disorder. \par Interval history: He was seen in past by Dr. Smart and Clarissa CORTEZ Headaches are infrequent and mild. Mother also notes inattention and distractibility. She feels that guanfacine is helpful. Follow up is planned with developmental pediatrics.\par Development/Education: Regular classes. SLT and counseling.\par Behavior: Inattention, impulsive.\par Sleep: Sleeping well. \par

## 2023-06-07 ENCOUNTER — TELEPHONE (OUTPATIENT)
Dept: OBGYN CLINIC | Facility: CLINIC | Age: 28
End: 2023-06-07

## 2023-06-07 NOTE — TELEPHONE ENCOUNTER
MLM delivered on 6/2 pt looking for apt June 23rd or after for post partum pt needs late apt., nothing available

## 2023-06-23 ENCOUNTER — POSTPARTUM (OUTPATIENT)
Dept: OBGYN CLINIC | Facility: CLINIC | Age: 28
End: 2023-06-23

## 2023-06-23 VITALS
WEIGHT: 235 LBS | DIASTOLIC BLOOD PRESSURE: 68 MMHG | BODY MASS INDEX: 36.88 KG/M2 | HEIGHT: 67 IN | SYSTOLIC BLOOD PRESSURE: 114 MMHG

## 2023-06-23 PROCEDURE — 3008F BODY MASS INDEX DOCD: CPT | Performed by: OBSTETRICS & GYNECOLOGY

## 2023-06-23 PROCEDURE — 3074F SYST BP LT 130 MM HG: CPT | Performed by: OBSTETRICS & GYNECOLOGY

## 2023-06-23 PROCEDURE — 3078F DIAST BP <80 MM HG: CPT | Performed by: OBSTETRICS & GYNECOLOGY

## 2023-09-23 NOTE — DISCHARGE SUMMARY
[facility]    Discharge Summary    Kym Heath Patient Status:  Inpatient    1995 MRN [id number]   Location [facility] 3SE Attending Mainor Jackson MD   Hosp Day # 2 PCP No primary care provider on file. Primary OB Clinician: Mo Adrian    EDC: Estimated Date of Delivery: 23    Gestational Age: 44w2d    Antepartum complications: gestational diabetes and incompetent cervix and obesity    Date of Delivery: 23    Time of Delivery:     Delivered By: Oliver Torres    Delivery Type: spontaneous vaginal delivery    Tubal Ligation: n/a    Baby: Liveborn      Anesthesia: local    Intrapartum Complications: None    Laceration: 2nd degree    Episiotomy: none    Placenta: spontaneous    Feeding Method: breast fed    Discharge Date: 23    Discharge Time:    midday    Early Discharge:  NO    Alert and oriented nad  abd soft nontender, fundus firm  Ext nt    Discharge counseling. accuchecks at home. Plan:     Address and phone number verified and same. Follow-up appointment with office in 3 weeks. Kevon Dai MD  2023  8:37 AM Opt out

## 2025-01-06 ENCOUNTER — TELEPHONE (OUTPATIENT)
Dept: OBGYN CLINIC | Facility: CLINIC | Age: 30
End: 2025-01-06

## 2025-01-06 NOTE — TELEPHONE ENCOUNTER
Pt name and  verified. Pt calling to establish care.     Lmp:  5w4d  +hpt: a week ago  Cycles: regular  Hx of miscarriage: threatened miscarriage     Pt scheduled for  with Dr. Alvarez for missed menses. Pt aware of scheduling details.

## 2025-01-23 ENCOUNTER — INITIAL PRENATAL (OUTPATIENT)
Dept: OBGYN CLINIC | Facility: CLINIC | Age: 30
End: 2025-01-23

## 2025-01-23 VITALS
DIASTOLIC BLOOD PRESSURE: 82 MMHG | SYSTOLIC BLOOD PRESSURE: 125 MMHG | HEART RATE: 77 BPM | WEIGHT: 232 LBS | BODY MASS INDEX: 36 KG/M2

## 2025-01-23 DIAGNOSIS — N92.6 MISSED MENSES: ICD-10-CM

## 2025-01-23 DIAGNOSIS — O99.211 OBESITY AFFECTING PREGNANCY IN FIRST TRIMESTER, UNSPECIFIED OBESITY TYPE (HCC): Primary | ICD-10-CM

## 2025-01-23 PROBLEM — Z86.32 HX OF GESTATIONAL DIABETES IN PRIOR PREGNANCY, CURRENTLY PREGNANT, FIRST TRIMESTER (HCC): Status: ACTIVE | Noted: 2025-01-23

## 2025-01-23 PROBLEM — F41.9 ANXIETY: Status: ACTIVE | Noted: 2025-01-23

## 2025-01-23 PROBLEM — O24.410 DIET CONTROLLED GESTATIONAL DIABETES MELLITUS (GDM) IN THIRD TRIMESTER (HCC): Status: RESOLVED | Noted: 2023-04-07 | Resolved: 2025-01-23

## 2025-01-23 PROBLEM — E66.9 OBESITY (BMI 35.0-39.9 WITHOUT COMORBIDITY): Status: ACTIVE | Noted: 2025-01-23

## 2025-01-23 PROBLEM — O99.213 OBESITY AFFECTING PREGNANCY IN THIRD TRIMESTER (HCC): Status: RESOLVED | Noted: 2023-02-23 | Resolved: 2025-01-23

## 2025-01-23 PROBLEM — O34.32 CERVICAL CERCLAGE SUTURE PRESENT IN SECOND TRIMESTER (HCC): Status: RESOLVED | Noted: 2023-03-10 | Resolved: 2025-01-23

## 2025-01-23 PROBLEM — O09.819 PREGNANCY RESULTING FROM ASSISTED REPRODUCTIVE TECHNOLOGY, ANTEPARTUM (HCC): Status: RESOLVED | Noted: 2023-02-23 | Resolved: 2025-01-23

## 2025-01-23 PROBLEM — O09.291 HX OF GESTATIONAL DIABETES IN PRIOR PREGNANCY, CURRENTLY PREGNANT, FIRST TRIMESTER (HCC): Status: ACTIVE | Noted: 2025-01-23

## 2025-01-23 PROBLEM — Z34.90 PREGNANCY (HCC): Status: RESOLVED | Noted: 2023-05-11 | Resolved: 2025-01-23

## 2025-01-23 LAB
CONTROL LINE PRESENT WITH A CLEAR BACKGROUND (YES/NO): YES YES/NO
KIT LOT #: NORMAL NUMERIC
PREGNANCY TEST, URINE: POSITIVE

## 2025-01-23 PROCEDURE — 81025 URINE PREGNANCY TEST: CPT | Performed by: OBSTETRICS & GYNECOLOGY

## 2025-01-23 RX ORDER — PYRIDOXINE HCL (VITAMIN B6) 25 MG
25 TABLET ORAL 3 TIMES DAILY PRN
Qty: 60 TABLET | Refills: 3 | Status: SHIPPED | OUTPATIENT
Start: 2025-01-23

## 2025-01-23 RX ORDER — SWAB
1 SWAB, NON-MEDICATED MISCELLANEOUS DAILY
COMMUNITY

## 2025-01-23 RX ORDER — SERTRALINE HYDROCHLORIDE 25 MG/1
TABLET, FILM COATED ORAL
COMMUNITY

## 2025-01-23 RX ORDER — FERROUS SULFATE 325(65) MG
325 TABLET ORAL
Qty: 90 TABLET | Refills: 3 | Status: SHIPPED | OUTPATIENT
Start: 2025-01-23

## 2025-01-23 RX ORDER — CALCIUM CARBONATE 500(1250)
1000 TABLET ORAL DAILY
Qty: 120 TABLET | Refills: 2 | Status: SHIPPED | OUTPATIENT
Start: 2025-01-23 | End: 2025-03-24

## 2025-01-23 RX ORDER — MULTIVIT-MIN/IRON/FOLIC ACID/K 18-600-40
1 CAPSULE ORAL DAILY
Qty: 90 CAPSULE | Refills: 2 | Status: SHIPPED | OUTPATIENT
Start: 2025-01-23 | End: 2025-10-20

## 2025-01-23 NOTE — PROGRESS NOTES
Kari Baires is a 29 year old female  Patient's last menstrual period was 2024 (exact date).   Chief Complaint   Patient presents with    Prenatal Care     Pt here for new ob visit       OBSTETRICS HISTORY:     OB History    Para Term  AB Living   2 1 1     1   SAB IAB Ectopic Multiple Live Births           1      # Outcome Date GA Lbr Jatin/2nd Weight Sex Type Anes PTL Lv   2 Current            1 Term 23 39w2d 12:00 / 00:22 7 lb 11.5 oz (3.5 kg) M NORMAL SPONT Local N HEATH      Complications:  labor, Fetal tachycardia, Other - see comments       GYNE HISTORY:     Hx Prior Abnormal Pap: No   Menarche: 11 (2025  3:43 PM)  Period Cycle (Days): 28 (2025  3:43 PM)  Period Duration (Days): 3-4 (2025  3:43 PM)  Period Flow: moderate (2025  3:43 PM)  Use of Birth Control (if yes, specify type): None (2025  3:43 PM)  Hx Prior Abnormal Pap: No (2025  3:43 PM)         No data to display                  MEDICAL HISTORY:     Past Medical History:    Anxiety    Cervical incompetence    At 18 weeks gestation    Decorative tattoo    Depression    Polycystic bilateral ovaries    Thyroid disease       SURGICAL HISTORY:     Past Surgical History:   Procedure Laterality Date    Hc cervical cerclage N/A 2023    Shirley cerclage       SOCIAL HISTORY:     Social History     Socioeconomic History    Marital status:    Tobacco Use    Smoking status: Never    Smokeless tobacco: Never   Vaping Use    Vaping status: Never Used   Substance and Sexual Activity    Alcohol use: Not Currently    Drug use: Never     Social Drivers of Health     Financial Resource Strain: Low Risk  (2023)    Financial Resource Strain     Difficulty of Paying Living Expenses: Not hard at all     Med Affordability: No    Received from [email or web address] and [employer]    Food Insecurities    Received from [email or web address] and [employer]    Transportation    Stress: No Stress Concern Present (4/7/2023)    Stress     Feeling of Stress : No    Received from [facility] and [facility]    Housing/Utilities        FAMILY HISTORY:     History reviewed. No pertinent family history.    MEDICATIONS:       Current Outpatient Medications:     Pyridoxine HCl 25 MG Oral Tab, Take 1 tablet (25 mg total) by mouth 3 (three) times daily as needed., Disp: 60 tablet, Rfl: 3    Ferrous Sulfate 325 (65 Fe) MG Oral Tab, Take 1 tablet (325 mg total) by mouth every morning before breakfast., Disp: 90 tablet, Rfl: 3    Calcium 500 MG Oral Tab, Take 1,000 mg by mouth daily for 60 doses., Disp: 120 tablet, Rfl: 2    Cholecalciferol (VITAMIN D) 50 MCG (2000 UT) Oral Cap, Take 1 capsule (2,000 Units total) by mouth daily., Disp: 90 capsule, Rfl: 2    Prenatal 28-0.8 MG Oral Tab, Take 1 tablet by mouth daily., Disp: , Rfl:     sertraline 25 MG Oral Tab, 1 TAB BY MOUTH EVERY DAY FOR 2 WEEKS, THEN 2 TABS BY MOUTH EVERY DAY, Disp: , Rfl:     ALLERGIES:     Allergies[1]      REVIEW OF SYSTEMS:     Constitutional:    denies fever / chills  Eyes:     denies blurred or double vision  Cardiovascular:  denies chest pain or palpitations  Respiratory:    denies shortness of breath  Gastrointestinal:  denies severe abdominal pain, frequent diarrhea or constipation, nausea / vomiting  Genitourinary:    denies dysuria, bothersome incontinence  Skin/Breast:   denies any breast pain, lumps, or discharge  Neurological:    denies frequent severe headaches  Psychiatric:   denies depression or anxiety, thoughts of harming self or others  Heme/Lymph:    denies easy bruising or bleeding      PHYSICAL EXAM:   Blood pressure 125/82, pulse 77, weight 232 lb (105.2 kg), last menstrual period 11/28/2024, currently breastfeeding.  Constitutional:  well developed, well nourished  Head/Face:  normocephalic  Neck/Thyroid: thyroid symmetric, no thyromegaly, no nodules, no adenopathy  Lymphatic: no abnormal  supraclavicular or axillary adenopathy is noted  Respiratory:      chest wall symmetric and nontender on palpation, clear to asculation bilateral, no wheezing, rales, ronchi, and resonance normal upon percussion  Cardiovascular: chest normal in appearance, regular rate and rhythm, no murmurs, PMI palpated midclavicular line  Abdomen:   soft, nontender, nondistended, no masses  Skin/Hair:  no unusual rashes or bruises  Extremities:  no edema, no cyanosis, non tender bilaterally  Psychiatric:   oriented to time, place, person and situation. Appropriate mood and affect    Bs ultrasound live iup    ASSESSMENT & PLAN:     Kari was seen today for prenatal care.    Diagnoses and all orders for this visit:    Obesity affecting pregnancy in first trimester, unspecified obesity type (HCC)  -     Pyridoxine HCl 25 MG Oral Tab; Take 1 tablet (25 mg total) by mouth 3 (three) times daily as needed.  -     Ferrous Sulfate 325 (65 Fe) MG Oral Tab; Take 1 tablet (325 mg total) by mouth every morning before breakfast.  -     Calcium 500 MG Oral Tab; Take 1,000 mg by mouth daily for 60 doses.  -     Cholecalciferol (VITAMIN D) 50 MCG (2000 UT) Oral Cap; Take 1 capsule (2,000 Units total) by mouth daily.  -     CBC W Differential W Platelet; Future  -     Rubella, IGG; Future  -     Antibody Screen; Future  -     T PALLIDUM SCREENING CASCADE; Future  -     Hepatitis B Surface Antigen; Future  -     Blood Type, ABO And Rh D; Future  -     Urine Culture, Routine; Future  -     HIV AG AB Combo; Future  -     HCV Antibody; Future  -     Hemoglobin A1C; Future  -     Glucose Tolerance, 100 gm (0 hr, 1 hr, 2hr, 3hr), Gestational (ADA); Future  -     Cystic Fibrosis (CF), 97 Variants; Future  -     Spinal Muscular Atrophy (SMA); Future  -     Chlamydia/Gc Amplification; Future  -     Assay, Thyroid Stim Hormone; Future  -     Free T4, (Free Thyroxine); Future  -     Comp Metabolic Panel (14); Future  -     Protein/Creatinine Ratio, Urine  Random; Future  -     MATERNAL FETAL MEDICINE - INTERNAL    Missed menses  -     POC Urine pregnancy test [54644]      History and physical exam have been performed.  If you ever need to reach a provider please call the office phone number.  After office hours there is always somebody on call.  Reasons to call the provider discussed with patient.  Prenatal vitamins have been prescribed. The prenatal vitamin has iron and folic acid which helps to prevent iron deficiency anemia and neural tube defects.  Additional iron has been prescribed and should be taken every other day.  Vitamin D supplementation 2377-5706 Iunits daily can be given for pregnant patients whose children are deemed at high risk of asthma. (Patient or her  has asthma).  Vitamin B6 can be prescribed if there is nausea or vomiting and is safe to use up to 3 times daily.  Antacids for reflux are safe.  Tylenol Cold daytime or Tylenol flu daytime are safe to use if there are upper respiratory symptoms.  Extra strength Tylenol can be used for persistent headaches and back pain but in a limited fashion.  Avoidance of nonsteroidals discussed with the patient.  A healthy diet is important and dietary counseling done with the patient..  I counseled that fruits, vegetables, legumes, fish, lean meats, chicken, turkey, nuts and seeds are advised.  Fish to avoid are Aron Mackerel, [place], Orange roughy, Shark, Swordfish, Tilefish, Bigeye tuna. Canned light tuna (including skipjack) is a good choice.  Please avoid fried and greasy foods.  Please avoid caffeine, alcohol, THC.  I recommend calcium supplementation 500 mg daily and Vitamin D 1,000 mg daily that the patient can obtain over the counter.  Exercise is encouraged and is okay for 30 minutes daily 5 to 7 days/week.  Moderate intensity exercise (able to carry on a normal conversation during exercise) is advised.  Strength training is allowed in a safe manner as to not cause back injury.  Sexual  intercourse is allowed if there is no vaginal bleeding . Hot tubs and saunas should be avoided during the first trimester.  Swimming is okay to participate.  The patient should be up-to-date regarding COVID-19 vaccination and the influenza vaccine is recommended during influenza season.   Pregnancy risk factors were reviewed with the patient and prenatal visit frequency and potential timing of future ultrasounds and fetal monitoring if needed were discussed with the patient.  I reviewed the above with the patient and spent approx 32 minutes face to face consultation.       FOLLOW-UP     Return in about 3 weeks (around 2/13/2025) for prenatal visit.      Sav Fuentes MD  1/23/2025         [1]   Allergies  Allergen Reactions    Strawberries FACE FLUSHING    Seasonal ITCHING

## 2025-02-12 ENCOUNTER — LAB ENCOUNTER (OUTPATIENT)
Dept: LAB | Age: 30
End: 2025-02-12
Attending: OBSTETRICS & GYNECOLOGY
Payer: COMMERCIAL

## 2025-02-12 DIAGNOSIS — O99.211 OBESITY AFFECTING PREGNANCY IN FIRST TRIMESTER, UNSPECIFIED OBESITY TYPE (HCC): ICD-10-CM

## 2025-02-12 LAB
ALBUMIN SERPL-MCNC: 4.3 G/DL (ref 3.2–4.8)
ALBUMIN/GLOB SERPL: 1.7 {RATIO} (ref 1–2)
ALP LIVER SERPL-CCNC: 41 U/L
ALT SERPL-CCNC: 12 U/L
ANION GAP SERPL CALC-SCNC: 9 MMOL/L (ref 0–18)
ANTIBODY SCREEN: NEGATIVE
AST SERPL-CCNC: 13 U/L (ref ?–34)
BASOPHILS # BLD AUTO: 0.05 X10(3) UL (ref 0–0.2)
BASOPHILS NFR BLD AUTO: 0.7 %
BILIRUB SERPL-MCNC: 0.3 MG/DL (ref 0.3–1.2)
BUN BLD-MCNC: 5 MG/DL (ref 9–23)
BUN/CREAT SERPL: 9.8 (ref 10–20)
CALCIUM BLD-MCNC: 9 MG/DL (ref 8.7–10.4)
CHLORIDE SERPL-SCNC: 102 MMOL/L (ref 98–112)
CO2 SERPL-SCNC: 25 MMOL/L (ref 21–32)
CREAT BLD-MCNC: 0.51 MG/DL
CREAT UR-SCNC: 119.7 MG/DL
DEPRECATED RDW RBC AUTO: 39.4 FL (ref 35.1–46.3)
EGFRCR SERPLBLD CKD-EPI 2021: 130 ML/MIN/1.73M2 (ref 60–?)
EOSINOPHIL # BLD AUTO: 0.12 X10(3) UL (ref 0–0.7)
EOSINOPHIL NFR BLD AUTO: 1.8 %
ERYTHROCYTE [DISTWIDTH] IN BLOOD BY AUTOMATED COUNT: 11.9 % (ref 11–15)
EST. AVERAGE GLUCOSE BLD GHB EST-MCNC: 100 MG/DL (ref 68–126)
FASTING STATUS PATIENT QL REPORTED: YES
GLOBULIN PLAS-MCNC: 2.5 G/DL (ref 2–3.5)
GLUCOSE 1H P GLC SERPL-MCNC: 136 MG/DL
GLUCOSE 2H P GLC SERPL-MCNC: 102 MG/DL
GLUCOSE 3H P GLC SERPL-MCNC: 114 MG/DL (ref 70–140)
GLUCOSE BLD-MCNC: 88 MG/DL (ref 70–99)
GLUCOSE P FAST SERPL-MCNC: 86 MG/DL
HBA1C MFR BLD: 5.1 % (ref ?–5.7)
HBV SURFACE AG SER-ACNC: <0.1 [IU]/L
HBV SURFACE AG SERPL QL IA: NONREACTIVE
HCT VFR BLD AUTO: 38.9 %
HCV AB SERPL QL IA: NONREACTIVE
HGB BLD-MCNC: 13.9 G/DL
IMM GRANULOCYTES # BLD AUTO: 0.01 X10(3) UL (ref 0–1)
IMM GRANULOCYTES NFR BLD: 0.1 %
LYMPHOCYTES # BLD AUTO: 1.62 X10(3) UL (ref 1–4)
LYMPHOCYTES NFR BLD AUTO: 24.2 %
MCH RBC QN AUTO: 32.5 PG (ref 26–34)
MCHC RBC AUTO-ENTMCNC: 35.7 G/DL (ref 31–37)
MCV RBC AUTO: 90.9 FL
MONOCYTES # BLD AUTO: 0.62 X10(3) UL (ref 0.1–1)
MONOCYTES NFR BLD AUTO: 9.3 %
NEUTROPHILS # BLD AUTO: 4.27 X10 (3) UL (ref 1.5–7.7)
NEUTROPHILS # BLD AUTO: 4.27 X10(3) UL (ref 1.5–7.7)
NEUTROPHILS NFR BLD AUTO: 63.9 %
OSMOLALITY SERPL CALC.SUM OF ELEC: 279 MOSM/KG (ref 275–295)
PLATELET # BLD AUTO: 257 10(3)UL (ref 150–450)
POTASSIUM SERPL-SCNC: 3.6 MMOL/L (ref 3.5–5.1)
PROT SERPL-MCNC: 6.8 G/DL (ref 5.7–8.2)
PROT UR-MCNC: 14.3 MG/DL (ref ?–14)
PROT/CREAT UR-RTO: 0.12
RBC # BLD AUTO: 4.28 X10(6)UL
RH BLOOD TYPE: POSITIVE
RUBV IGG SER QL: POSITIVE
RUBV IGG SER-ACNC: 22 IU/ML (ref 10–?)
SODIUM SERPL-SCNC: 136 MMOL/L (ref 136–145)
T PALLIDUM AB SER QL IA: NONREACTIVE
T4 FREE SERPL-MCNC: 1 NG/DL (ref 0.8–1.7)
TSI SER-ACNC: 3.03 UIU/ML (ref 0.55–4.78)
WBC # BLD AUTO: 6.7 X10(3) UL (ref 4–11)

## 2025-02-12 PROCEDURE — 86762 RUBELLA ANTIBODY: CPT

## 2025-02-12 PROCEDURE — 83036 HEMOGLOBIN GLYCOSYLATED A1C: CPT

## 2025-02-12 PROCEDURE — 87801 DETECT AGNT MULT DNA AMPLI: CPT

## 2025-02-12 PROCEDURE — 85025 COMPLETE CBC W/AUTO DIFF WBC: CPT

## 2025-02-12 PROCEDURE — 87340 HEPATITIS B SURFACE AG IA: CPT

## 2025-02-12 PROCEDURE — 86780 TREPONEMA PALLIDUM: CPT

## 2025-02-12 PROCEDURE — 80053 COMPREHEN METABOLIC PANEL: CPT

## 2025-02-12 PROCEDURE — 86900 BLOOD TYPING SEROLOGIC ABO: CPT

## 2025-02-12 PROCEDURE — 82951 GLUCOSE TOLERANCE TEST (GTT): CPT

## 2025-02-12 PROCEDURE — 87086 URINE CULTURE/COLONY COUNT: CPT

## 2025-02-12 PROCEDURE — 86850 RBC ANTIBODY SCREEN: CPT

## 2025-02-12 PROCEDURE — 86803 HEPATITIS C AB TEST: CPT

## 2025-02-12 PROCEDURE — 84443 ASSAY THYROID STIM HORMONE: CPT

## 2025-02-12 PROCEDURE — 84439 ASSAY OF FREE THYROXINE: CPT

## 2025-02-12 PROCEDURE — 84156 ASSAY OF PROTEIN URINE: CPT

## 2025-02-12 PROCEDURE — 87491 CHLMYD TRACH DNA AMP PROBE: CPT

## 2025-02-12 PROCEDURE — 81329 SMN1 GENE DOS/DELETION ALYS: CPT

## 2025-02-12 PROCEDURE — 82952 GTT-ADDED SAMPLES: CPT

## 2025-02-12 PROCEDURE — 82570 ASSAY OF URINE CREATININE: CPT

## 2025-02-12 PROCEDURE — 87389 HIV-1 AG W/HIV-1&-2 AB AG IA: CPT

## 2025-02-12 PROCEDURE — 81220 CFTR GENE COM VARIANTS: CPT

## 2025-02-12 PROCEDURE — 36415 COLL VENOUS BLD VENIPUNCTURE: CPT

## 2025-02-12 PROCEDURE — 87591 N.GONORRHOEAE DNA AMP PROB: CPT

## 2025-02-12 PROCEDURE — 86901 BLOOD TYPING SEROLOGIC RH(D): CPT

## 2025-02-13 ENCOUNTER — ROUTINE PRENATAL (OUTPATIENT)
Dept: OBGYN CLINIC | Facility: CLINIC | Age: 30
End: 2025-02-13

## 2025-02-13 VITALS
BODY MASS INDEX: 36 KG/M2 | WEIGHT: 233 LBS | SYSTOLIC BLOOD PRESSURE: 119 MMHG | DIASTOLIC BLOOD PRESSURE: 82 MMHG | HEART RATE: 75 BPM

## 2025-02-13 DIAGNOSIS — O99.211 OBESITY AFFECTING PREGNANCY IN FIRST TRIMESTER, UNSPECIFIED OBESITY TYPE (HCC): Primary | ICD-10-CM

## 2025-02-13 PROBLEM — O99.282: Status: RESOLVED | Noted: 2023-03-10 | Resolved: 2025-02-13

## 2025-02-13 PROBLEM — E03.9: Status: RESOLVED | Noted: 2023-03-10 | Resolved: 2025-02-13

## 2025-02-13 LAB
C TRACH DNA SPEC QL NAA+PROBE: NEGATIVE
N GONORRHOEA DNA SPEC QL NAA+PROBE: NEGATIVE

## 2025-02-13 NOTE — PROGRESS NOTES
Kari Baires is a 29 year old female  Patient's last menstrual period was 2024 (exact date).   Chief Complaint   Patient presents with    Prenatal Care     Pt here for  prenatal visit       OBSTETRICS HISTORY:     OB History    Para Term  AB Living   2 1 1     1   SAB IAB Ectopic Multiple Live Births           1      # Outcome Date GA Lbr Jatin/2nd Weight Sex Type Anes PTL Lv   2 Current            1 Term 23 39w2d 12:00 / 00:22 7 lb 11.5 oz (3.5 kg) M NORMAL SPONT Local N HEATH      Complications:  labor, Fetal tachycardia, Other - see comments       GYNE HISTORY:         Menarche: 11 (2025  3:43 PM)  Period Cycle (Days): 28 (2025  3:43 PM)  Period Duration (Days): 3-4 (2025  3:43 PM)  Period Flow: moderate (2025  3:43 PM)  Use of Birth Control (if yes, specify type): None (2025  3:43 PM)  Hx Prior Abnormal Pap: No (2025  3:43 PM)         No data to display                  MEDICAL HISTORY:     Past Medical History:    Anxiety    Cervical incompetence    At 18 weeks gestation    Decorative tattoo    Depression    Polycystic bilateral ovaries    Thyroid disease       SURGICAL HISTORY:     Past Surgical History:   Procedure Laterality Date    Hc cervical cerclage N/A 2023    Shirley cerclage       SOCIAL HISTORY:     Social History     Socioeconomic History    Marital status:    Tobacco Use    Smoking status: Never    Smokeless tobacco: Never   Vaping Use    Vaping status: Never Used   Substance and Sexual Activity    Alcohol use: Not Currently    Drug use: Never     Social Drivers of Health      Received from [facility]    Food Insecurities    Received from [facility]    Transportation   Stress: No Stress Concern Present (2023)    Stress     Feeling of Stress : No    Received from [facility]    Housing/Utilities        FAMILY HISTORY:  Subjective:     Steven Garland is a 39 y.o. male who complains today of:     Chief Complaint   Patient presents with    New Patient     sent here for evaluation for his snoring      Referred by dr. Robyn Tuttle  For sleep apnea. HPI  He has home sleep study done . Sleep study by [facility]  he does not have CPAP titration study. He is complaining of snoring and daytime sleepiness and tiredness. No C/o witness apnea. He wakes up with gasping for air. C/o wakes up frequently during sleep. No complaint of morning headache. He does not have restful sleep. He does not take daily naps. He does not fall asleep while watching TV. He does not have a complaint of sleepiness while driving. He does not have history of motor vehicle accident due to falling a sleep while driving. He does not have difficulty falling sleep or staying asleep  No significant Weight gain in last 6-12 month   No sleep walking,  talking or eating . No sleep onset hallucination. No sleep paralysis . No sleep attacks.       Allergies:  Bupropion, Escitalopram, and Hydroxyzine  Past Medical History:   Diagnosis Date    Depression with anxiety 4/24/2017    Hyperglycemia 4/24/2017    Hyperlipidemia 2/18/2014    Insomnia     Perennial allergic rhinitis with seasonal variation 4/24/2017    Pre-diabetes 4/24/2017    Tobacco use disorder 4/24/2017    Type 2 diabetes mellitus without complication, without long-term current use of insulin ([facility] [address] 8/6/2018     Past Surgical History:   Procedure Laterality Date    NASAL FRACTURE SURGERY  2007     Family History   Problem Relation Age of Onset    High Blood Pressure Mother     High Cholesterol Mother     Cancer Father         Throat cancer - smoker    High Blood Pressure Brother     Heart Attack Maternal Grandmother 72    Diabetes Maternal Grandmother     Cancer Maternal Grandfather         unknown    No Known Problems Paternal Grandmother     No Known Problems Paternal Grandfather     History reviewed. No pertinent family history.    MEDICATIONS:       Current Outpatient Medications:     Prenatal 28-0.8 MG Oral Tab, Take 1 tablet by mouth daily., Disp: , Rfl:     sertraline 25 MG Oral Tab, 1 TAB BY MOUTH EVERY DAY FOR 2 WEEKS, THEN 2 TABS BY MOUTH EVERY DAY, Disp: , Rfl:     Pyridoxine HCl 25 MG Oral Tab, Take 1 tablet (25 mg total) by mouth 3 (three) times daily as needed., Disp: 60 tablet, Rfl: 3    Ferrous Sulfate 325 (65 Fe) MG Oral Tab, Take 1 tablet (325 mg total) by mouth every morning before breakfast., Disp: 90 tablet, Rfl: 3    Calcium 500 MG Oral Tab, Take 1,000 mg by mouth daily for 60 doses., Disp: 120 tablet, Rfl: 2    Cholecalciferol (VITAMIN D) 50 MCG (2000 UT) Oral Cap, Take 1 capsule (2,000 Units total) by mouth daily., Disp: 90 capsule, Rfl: 2    ALLERGIES:     Allergies[1]      REVIEW OF SYSTEMS:     Constitutional:    denies fever / chills  Cardiovascular:  denies chest pain or palpitations  Respiratory:    denies shortness of breath  Gastrointestinal:  denies severe abdominal pain, frequent diarrhea or constipation, nausea / vomiting  Genitourinary:    denies dysuria, bothersome incontinence  Skin/Breast:   denies any breast pain, lumps, or discharge  Neurological:    denies frequent severe headaches  Psychiatric:   denies depression or anxiety, thoughts of harming self or others      PHYSICAL EXAM:   Blood pressure 119/82, pulse 75, weight 233 lb (105.7 kg), last menstrual period 11/28/2024, currently breastfeeding.  Constitutional:  well developed, well nourished, no distress  Abdomen:   soft, gravid, nontender  Musculoskeletal: no cva tenderness bilaterally  Skin/Hair:  no unusual rashes or bruises  Extremities:  no edema, no cyanosis, non tender bilaterally  Psychiatric:   oriented to time, place, person and situation. Appropriate mood and affect    Bs ultrasound live iup  ASSESSMENT & PLAN:     Kari was seen today for prenatal care.    Diagnoses and all orders for  Social History     Socioeconomic History    Marital status:      Spouse name: Danielito Bautista Number of children: 3    Years of education: Not on file    Highest education level: Not on file   Occupational History    Occupation:  - industrial cleaning   Tobacco Use    Smoking status: Current Every Day Smoker     Packs/day: 0.50     Years: 23.00     Pack years: 11.50     Types: Cigarettes     Start date: 1994    Smokeless tobacco: Never Used   Vaping Use    Vaping Use: Never used   Substance and Sexual Activity    Alcohol use: No    Drug use: No    Sexual activity: Yes     Partners: Female     Comment: monogamous   Other Topics Concern    Not on file   Social History Narrative    Lives with wife and 3 children        1 dog     Social Determinants of Health     Financial Resource Strain:     Difficulty of Paying Living Expenses:    Food Insecurity:     Worried About Running Out of Food in the Last Year:     [address] in the Last Year:    Transportation Needs:     Lack of Transportation (Medical):  Lack of Transportation (Non-Medical):    Physical Activity:     Days of Exercise per Week:     Minutes of Exercise per Session:    Stress:     Feeling of Stress :    Social Connections:     Frequency of Communication with Friends and Family:     Frequency of Social Gatherings with Friends and Family:     Attends [employer] Services:     Active Member of Clubs or Organizations:     Attends Club or Organization Meetings:     Marital Status:    Intimate Partner Violence:     Fear of Current or Ex-Partner:     Emotionally Abused:     Physically Abused:     Sexually Abused:          Review of Systems   Constitutional: Negative for chills, diaphoresis, fatigue and fever. HENT: Negative for congestion, mouth sores, nosebleeds, postnasal drip, rhinorrhea, sneezing, sore throat and voice change. Eyes: Negative for itching and visual disturbance.    Respiratory: Negative for cough, chest tightness, shortness of breath and wheezing. Cardiovascular: Negative. Negative for chest pain, palpitations and leg swelling. Gastrointestinal: Negative for abdominal pain, diarrhea, nausea and vomiting. Genitourinary: Negative for difficulty urinating and hematuria. Musculoskeletal: Negative for arthralgias, joint swelling and myalgias. Skin: Negative for rash. Allergic/Immunologic: Negative for environmental allergies. Neurological: Negative for dizziness, tremors, weakness and headaches. Psychiatric/Behavioral: Positive for sleep disturbance. Negative for behavioral problems. :     Vitals:    08/06/21 0917   BP: 132/80   Pulse: 71   Temp: 98.2 °F (36.8 °C)   SpO2: 99%   Weight: 249 lb (112.9 kg)   Height: 5' 10\" (1.778 m)     Wt Readings from Last 3 Encounters:   08/06/21 249 lb (112.9 kg)   07/28/21 250 lb (113.4 kg)   07/12/21 240 lb (108.9 kg)         Physical Exam  Constitutional:       Appearance: He is well-developed. He is obese. HENT:      Head: Normocephalic and atraumatic. Nose: Nose normal.   Eyes:      Conjunctiva/sclera: Conjunctivae normal.      Pupils: Pupils are equal, round, and reactive to light. Neck:      Thyroid: No thyromegaly. Vascular: No JVD. Trachea: No tracheal deviation. Cardiovascular:      Rate and Rhythm: Normal rate and regular rhythm. Heart sounds: No murmur heard. No friction rub. No gallop. Pulmonary:      Effort: Pulmonary effort is normal. No respiratory distress. Breath sounds: Normal breath sounds. No wheezing or rales. Chest:      Chest wall: No tenderness. Abdominal:      General: There is no distension. Musculoskeletal:         General: Normal range of motion. Lymphadenopathy:      Cervical: No cervical adenopathy. Skin:     General: Skin is warm and dry. Findings: No rash. Neurological:      Mental Status: He is alert and oriented to person, place, and time.       Cranial this visit:    Obesity affecting pregnancy in first trimester, unspecified obesity type (HCC)      Nipt pending    FOLLOW-UP     Return in about 4 weeks (around 3/13/2025).      Sav Fuentes MD  2/13/2025         [1]   Allergies  Allergen Reactions    Strawberries FACE FLUSHING    Seasonal ITCHING      Nerves: No cranial nerve deficit. Psychiatric:         Behavior: Behavior normal.         Current Outpatient Medications   Medication Sig Dispense Refill    lidocaine (LMX) 4 % cream Apply a half dollar sized amount to intact skin topically up to twice daily as needed for pain 1 Tube 1    olopatadine (PATANOL) 0.1 % ophthalmic solution Place 1 drop into both eyes 2 times daily 5 mL 0    montelukast (SINGULAIR) 10 MG tablet Take 1 tablet by mouth daily 30 tablet 1    ipratropium (ATROVENT) 0.06 % nasal spray 2 sprays by Nasal route 3 times daily 1 Bottle 1    metFORMIN (GLUCOPHAGE) 500 MG tablet Take 1 tablet by mouth 2 times daily (with meals) 60 tablet 5    rosuvastatin (CRESTOR) 10 MG tablet Take 1 tablet by mouth nightly 30 tablet 5    Elastic Bandages & Supports (KNEE BRACE) MISC Wear during the day over right knee as needed for support 1 each 0    loratadine (CLARITIN) 10 MG tablet Take 1 tablet by mouth daily 30 tablet 5    acetaminophen (TYLENOL) 500 MG tablet Take 1 tablet by mouth every 6 hours as needed for Pain. 60 tablet 0    naproxen sodium (ANAPROX) 550 MG tablet Take 1 tablet by mouth 2 times daily (with meals) for 14 days 28 tablet 0     No current facility-administered medications for this visit. Results for orders placed during the hospital encounter of 03/28/18    XR CHEST PORTABLE    Narrative  Chest one view. HISTORY:    Chest pain. FINDINGS:    Comparison, chest radiograph, September 8, 2016. Telemetry leads overlie central chest. Osseous structures intact. Cardiopericardial silhouette is normal. Pulmonary vasculature is normal. Lungs are clear. Impression  No acute cardiopulmonary disease. Assessment/Plan:     1. Obstructive sleep apnea  He has home sleep study done . Sleep study by [facility] does not have CPAP titration study. He is complaining of snoring and daytime sleepiness and tiredness. No C/o witness apnea. He wakes up with gasping for air.  C/o wakes up frequently during sleep. No complaint of morning headache. He does not have restful sleep. He  Will provide copy of HST. He is aware that we will need home sleep study. In order to get CPAP therapy. he will be started on Auto CPAP since he does not want to go for CPAP titration study. Counseling:Driving: He is advised for extreme caution when driving or operating machinery if there is a feeling of drowsiness, especially while driving it is preferable to stop driving and take a brief nap. Sleep hygiene:Avoid supine sleep, sleep on  sides. Avoid  sleep deprivation. Explained sleep hygiene. Advice to avoid Alcohol and sedative    2. Obesity (BMI 30-39. 9)  He is advised try to lose weight. obesity related risk explained to the patient ,  Current weight:  249 lb (112.9 kg) Lbs. BMI:  Body mass index is 35.73 kg/m². Suggested weight control approaches, including dietary changes , exercise, behavioral modification. Return in about 3 weeks (around 8/27/2021) for tobi.       Mono Jarrett MD

## 2025-02-15 LAB
GESTATIONAL AGE > OR = 9W:: YES
MONOSOMY X (TURNER SYNDROME): NOT DETECTED
TEST RESULT: NEGATIVE
TRISOMY 13 (PATAU SYNDROME): NEGATIVE
TRISOMY 18 (EDWARDS SYNDROME): NEGATIVE
TRISOMY 21 (DOWN SYNDROME): NEGATIVE
XXX (TRIPLE X SYNDROME): NOT DETECTED
XXY (KLINEFELTER SYNDROME): NOT DETECTED
XYY (JACOBS SYNDROME): NOT DETECTED

## 2025-02-20 ENCOUNTER — TELEPHONE (OUTPATIENT)
Dept: PERINATAL CARE | Facility: HOSPITAL | Age: 30
End: 2025-02-20

## 2025-02-20 ENCOUNTER — HOSPITAL ENCOUNTER (OUTPATIENT)
Dept: PERINATAL CARE | Facility: HOSPITAL | Age: 30
Discharge: HOME | End: 2025-02-20
Attending: OBSTETRICS & GYNECOLOGY
Payer: COMMERCIAL

## 2025-02-20 VITALS
WEIGHT: 233 LBS | HEART RATE: 78 BPM | BODY MASS INDEX: 36 KG/M2 | DIASTOLIC BLOOD PRESSURE: 74 MMHG | SYSTOLIC BLOOD PRESSURE: 130 MMHG

## 2025-02-20 DIAGNOSIS — E66.9 OBESITY (BMI 35.0-39.9 WITHOUT COMORBIDITY): ICD-10-CM

## 2025-02-20 DIAGNOSIS — O99.211 OBESITY AFFECTING PREGNANCY IN FIRST TRIMESTER, UNSPECIFIED OBESITY TYPE (HCC): ICD-10-CM

## 2025-02-20 DIAGNOSIS — Z86.32 HX OF GESTATIONAL DIABETES IN PRIOR PREGNANCY, CURRENTLY PREGNANT, FIRST TRIMESTER (HCC): ICD-10-CM

## 2025-02-20 DIAGNOSIS — Z36.9 ENCOUNTER FOR ANTENATAL SCREENING OF MOTHER (HCC): Primary | ICD-10-CM

## 2025-02-20 DIAGNOSIS — O09.291 HX OF GESTATIONAL DIABETES IN PRIOR PREGNANCY, CURRENTLY PREGNANT, FIRST TRIMESTER (HCC): ICD-10-CM

## 2025-02-20 DIAGNOSIS — Z36.9 ENCOUNTER FOR ANTENATAL SCREENING OF MOTHER (HCC): ICD-10-CM

## 2025-02-20 DIAGNOSIS — O09.291: ICD-10-CM

## 2025-02-20 PROCEDURE — 76813 OB US NUCHAL MEAS 1 GEST: CPT | Performed by: OBSTETRICS & GYNECOLOGY

## 2025-02-20 NOTE — PROGRESS NOTES
Pt for 1st tri screen  Denies pregnancy complaints  NIPT with ob neg    My chart message with [facility] instructions sent

## 2025-02-20 NOTE — PROGRESS NOTES
Outpatient Maternal-Fetal Medicine Consultation    Dear Dr. Fuentes,    Thank you for requesting ultrasound evaluation and maternal fetal medicine consultation on your patient Kari Baires.  As you are aware she is a 29 year old female with a Spicer pregnancy at 12w0d.  A maternal-fetal medicine consultation was requested secondary to obesity and history of gestational hypertension.  Her prenatal records and labs were reviewed.    Additionally her prior pregnancy was complicated by incompetent cervix and cerclage was placed.  She subsequently delivered at term.    Her first pregnancy was an IUI pregnancy.  She was on levothyroxine for hypothyroidism.  She reports that overall her follow-up was not as good as it is prior to this pregnancy.  She was tolerant 18 weeks to be dilated having membranes present at the external cervix.  A rescue cerclage was placed and she did subsequently get to term.  She did not have any hypertensive complications in that pregnancy but she did develop gestational diabetes.    She reports that she has made lifestyle changes since her last delivery and is starting this pregnancy 25 to 30 pounds lighter than she was with her last pregnancy.  She is also unable to get off the levothyroxine.    She had some postpartum anxiety which increased dramatically with this pregnancy.  This pregnancy was unexpected but desired.  She is now on sertraline 50 mg daily and is doing well.    HISTORY  OB History    Para Term  AB Living   2 1 1 0 0 1   SAB IAB Ectopic Multiple Live Births   0 0 0 0 1     # 1 - Date: 23, Sex: Male, Weight: 7 lb 11.5 oz (3.5 kg), GA: 39w2d, Type: Normal spontaneous vaginal delivery, Apgar1: 8, Apgar5: 9, Living: Living, Birth Comments: None    # 2 - Date: None, Sex: None, Weight: None, GA: None, Type: None, Apgar1: None, Apgar5: None, Living: None, Birth Comments: None    Past Medical History  The patient  has a past medical history of Anxiety,  Cervical incompetence, Decorative tattoo, Depression, Obesity (BMI 35.0-39.9 without comorbidity), Polycystic bilateral ovaries, and Thyroid disease.    Past Surgical History  The patient  has a past surgical history that includes hc cervical cerclage (N/A, 01/05/2023).    Family History  The patient has no family status information on file.       Medications:   Current Outpatient Medications:     Prenatal 28-0.8 MG Oral Tab, Take 1 tablet by mouth daily., Disp: , Rfl:     sertraline 25 MG Oral Tab, 1 TAB BY MOUTH EVERY DAY FOR 2 WEEKS, THEN 2 TABS BY MOUTH EVERY DAY, Disp: , Rfl:     Pyridoxine HCl 25 MG Oral Tab, Take 1 tablet (25 mg total) by mouth 3 (three) times daily as needed., Disp: 60 tablet, Rfl: 3    Ferrous Sulfate 325 (65 Fe) MG Oral Tab, Take 1 tablet (325 mg total) by mouth every morning before breakfast., Disp: 90 tablet, Rfl: 3    Calcium 500 MG Oral Tab, Take 1,000 mg by mouth daily for 60 doses., Disp: 120 tablet, Rfl: 2    Cholecalciferol (VITAMIN D) 50 MCG (2000 UT) Oral Cap, Take 1 capsule (2,000 Units total) by mouth daily., Disp: 90 capsule, Rfl: 2  Allergies: Allergies[1]      PHYSICAL EXAMINATION:  /74   Pulse 78   Wt 233 lb (105.7 kg)   LMP 11/28/2024 (Exact Date)   BMI 36.49 kg/m²   General: alert and oriented,no acute distress  Abdomen: gravid, soft, non-tender  Extremities: non-tender, no edema        OBSTETRIC ULTRASOUND  The patient had a first trimester ultrasound today which I interpreted the results and reviewed them with the patient.    Single IUP with cardiac activity 155 bpm  Amniotic fluid is normal.  Placenta location is anterior  The CRL is consistent with gestational age. Nasal bone present. The nuchal translucency measures 1.4 mm. This is within normal limits.   The maternal uterus and ovaries appear normal.    See imaging tab for the complete US report.    DISCUSSION  During her visit we discussed and reviewed the following issues:  OBESITY:  Her BMI prior to  pregnancy was 35.4  Obesity during pregnancy is associated with numerous maternal and  risks.  It is not clear whether obesity is a direct cause of adverse pregnancy outcome or whether the association between obesity and adverse pregnancy outcome is due to factors such as diabetes mellitus.   Data suggest that obese women should be encouraged to undertake a weight reduction program (diet, exercise, behavior modification, and possibly bariatric surgery in some cases) prior to attempting to conceive.            Subfertility in obese women is most commonly related to ovulatory dysfunction, and, in some obese women, the ovulatory dysfunction is related to polycystic ovary syndrome (PCOS). It is also important to note that even among ovulatory women, increasing obesity is associated with decreasing spontaneous pregnancy rates.  The increased risk of miscarriage in obese women may be because such women often have PCOS or isolated insulin resistance.                 Due to its strong association with obesity in the general population, type 2 diabetes mellitus is one of the two most common medical complications of the obese . The increased risk of type 2 diabetes is primarily related to an exaggerated increase in insulin resistance in the obese state. It is reasonable to screen obese gravidas for undiagnosed pregestational diabetes in the first trimester.   Glucose intolerance associated with gestational diabetes generally resolves postpartum; however, obese women with a history of gestational diabetes have a two-fold increased prevalence of subsequent type 2 diabetes.           An association between obesity and hypertensive disorders during pregnancy has been consistently reported.  In particular, maternal weight and BMI are independent risk factors for preeclampsia.             Studies have found that the increased risk of  birth in obese gravidas is primarily associated with obesity-related  medical and  complications, rather than an intrinsic predisposition to spontaneous  birth. Prevention of  birth in these patients, therefore, should be directed toward prevention or management of medical and obstetrical complications.               Both prepregnancy obesity and excessive maternal weight gain before or during pregnancy contribute to an increased probability of  delivery.  It has also been hypothesized that obesity may lead to dystocia due to increased soft tissue deposition in the maternal pelvis.    delivery in the obese  is associated with numerous perioperative concerns, including emergency delivery, prolonged incision to delivery interval, blood loss >1000 mL, longer operative times, wound infection, thromboembolism, and endometritis.            Maternal obesity appears to be associated with a small increase in the absolute rate of some congenital anomalies (primarily neural tube defect and cardiac), and the risk may increase with increasing maternal weight.  Level II ultrasound is advised for women with obesity.  The risk of neural tube defects increased significantly with maternal weight.    The analysis found that overweight and obese pregnant women experienced significantly more stillbirths than normal weight women.  Third trimester  testing is advised.    We discussed the current recommendations for limited gestational weight gain in pregnancy for overweight and obese women.  The [employer] currently recommends that women keep gestational weight gain to between 8-18 lbs.  We discussed the role of mild to moderate exercise, healthy food choices and appropriate portions sized to help achieve this goal.  Excess weight gain is associated with higher rates of gestational diabetes, hypertensive complications, fetal macrosomia and delivery complications.  Women with weight loss or insufficient weight gain have higher rates of small  for gestational age infants.    A recent study found that initiating moderate exercise in early pregnancy for obese gravidas significantly reduced the incidence of gestational diabetes, gestational hypertension,  deliveries and C-sections. I encouraged Kari to begin moderate exercise such as walking or stationary bike in the pregnancy.      H/o incompetent cervix and cerclage -   Cervical cerclage refers to a variety of surgical procedures in which sutures, wires, or synthetic tape are used to reinforce the cervix. These procedures are intended to mechanically increase the tensile strength of the cervix, thereby reducing the occurrence of adverse  events associated with cervical insufficiency (ie, relatively painless cervical changes that occur in the second trimester and result in recurrent pregnancy loss).     Not all women are good candidates for cervical cerclage; the major contraindications are fetal anomaly incompatible with life, intrauterine infection, active bleeding, active  labor, premature rupture of membranes, and fetal demise. The presence of fetal membranes prolapsing through the external cervical os is a relative contraindication to the procedure because the risk of iatrogenic rupture of the membranes in this setting may exceed 50 percent.  Several techniques (eg, Trendelenburg position, back-filling the maternal bladder, decompression amniocentesis) have been used to make the fetal membranes retract prior to cerclage placement, with variable results     The lower and upper limits of gestational age for performing the procedure are not well defined. Waiting at least until the end of the first trimester also permits evaluation for some fetal anomalies, first trimester aneuploidy screening, or chorionic villus sampling prior to the procedure, if indicated.    Most cerclages are placed via a vaginal approach. The transabdominal approach is more invasive, but allows higher  placement since the transabdominal cerclage can be placed at the cervicoisthmic portion of the uterus, while transvaginal cerclages often end up distal to the internal os. In either case, the object is to reinforce the cervix at the level of the internal os. Lengthening of the cervix is a secondary effect. The length of cervix distal to the cerclage (ie, cerclage to external os) may not be an important factor in determining subsequent pregnancy outcome, but the length of closed cervix proximal to the cerclage appears to be predictive: a length >=10 mm is desirable     Cervical cerclage is the conventional treatment for cervical insufficiency, despite the paucity of data from randomized trials proving its efficacy. Most case series report a viable birth rate of 70 to 90 percent after elective cerclage, as compared with 10 to 30 percent prior to the procedure. However, using patients as their own controls (ie, pregnancy success rate is compared before and after cerclage) is subject to bias since changes in the patient and her management other than cerclage may have accounted for the higher rate of success in the subsequent pregnancy. In fact, trials in which women were randomly assigned to undergo cerclage or no cerclage report much higher live birth rates in the untreated group than observed in historic controls.    The timing of cerclage also affects outcome. There is universal agreement that emergency cerclage performed in the presence of advanced cervical changes and prolapsed membranes has the worst outcome. It should be appreciated that there is significant risk that an emergency procedure may convert a previable birth to a very low birthweight premature birth (24 to 27 weeks) with the potential for serious long-term neurodevelopmental disability.    We also discussed her options for serial cervical surveillance with placement of cerclage only for ultrasound indications.  We discussed that 50% of people who need  a cerclage in the past do not need a cerclage moving forward.  This is especially 2 of people of made positive lifestyle changes to improve their health.    We discussed the pros and cons of moving forward with cerclage.  Kari has decided to move forward with a prophylactic cerclage because this pregnancy is complicated by the fact that she is moving to [place] when she will be around 17 weeks gestation.  She is concerned that any delay in therapy because of this move could result in an emergency situation with cervical incompetence.    The schedule of a prophylactic cerclage or  at 7:30 in the morning.    Anxiety -controlled on sertraline           We discussed what is known about the safety of selective serotonin receptor inhibitors (SSRI) in pregnancy.    An increased risk of teratogenic effects, including cardiovascular defects, may be associated with maternal use of sertraline or other SSRIs.   Nonteratogenic effects that may been seen in the  period include respiratory distress, cyanosis, apnea, siezures, temperature instability, feeding difficulty, vomiting, hypoglycemia, increased or decreased tone, irritability, crying, hyper-reflexia, and jitteriness or tremor.   Exposure to SSRIs late in pregnancy has also been associated with persistent pulmonary hypertension of the .   Adverse effects may be due to toxic effects of the SSRI or drug withdrawal due to discontinuation. The long-term effects of in utero SSRI exposure on infant development and behavior are not known.           Due to pregnancy-induced physiologic changes, women who are pregnant may require increased doses of SSRI to achieve euthymia. Women treated for major depression and who are euthymic prior to pregnancy are more likely to experience a relapse when medication is discontinued as compared to pregnant women who continue taking antidepressant medications. The ACOG recommends that therapy with SSRIs or SNRIs during  pregnancy be individualized; treatment of depression during pregnancy should incorporate the clinical expertise of the mental health clinician, obstetrician, primary healthcare provider, and pediatrician (ACOG, 2007). The ACOG also recommend that therapy with paroxetine(Paxil) be avoided during pregnancy if possible and that fetuses exposed in early pregnancy be assessed with a fetal echocardiography.             If treatment during pregnancy is required, tapering therapy is not advised during the third trimester despite the potential for withdrawal symptoms in the infant. Other evidence supports that inadequately treated maternal mood disorders has other effects on  behavior that can be related to impaired mother-infant bonding from inadequately treated depression/anxiety. Although this outcome is more difficult to measure, the repercussions can be significant. In addition, even with immediate use of SSRI's after delviery, given their prolonged half-life, clinical effectiveness can take several weeks. This exposes the patient to potentially significant risks of exacerbation of her mood disorder at a particularly vulnerable time. This further emphasizes the need for an individualized approach to care.     We discussed the plan of care and her risk factors for recurrent GDM.  Kari had her questions answered to her satisfaction.    IMPRESSION:  IUP at 12w0d  Crown-rump length consistent with dates.  Normal first trimester anatomy  Class II obesity complicating pregnancy  History of gestational diabetes  History of cervical incompetence and cerclage placement  Anxiety, controlled with sertraline    RECOMMENDATIONS:  Continue care with Dr. Fuentes  Cervical cerclage 3/6/2025 at 7:30 in the morning  Cervical length follow-up 3/13/25  Plan level 2 ultrasound at 20 weeks  Third trimester growth ultrasound and BPP around 32 weeks  Weekly NST at 36 weeks  Plan cerclage removal at 36 weeks    Total time spent 60  minutes this calendar day which includes preparing to see the patient including chart review, obtaining and/or reviewing additional medical history, performing a physical exam and evaluation, documenting clinical information in the electronic medical record, independently interpreting results, counseling the patient, communicating results to the patient/family/caregiver and coordinating care.     Case discussed with patient who demonstrated understanding and agreement with plan.     Thank you for allowing me to participate in the care of this patient.  Please feel free to contact me with any questions.    Hazel Mendez MD  Maternal-Fetal Medicine       Note to patient and family:  The 21st Century Cures Act makes medical notes available to patients in the interest of transparency.  However, please be advised that this is a medical document.  It is intended as a peer to peer communication.  It is written in medical language and may contain abbreviations or verbiage that are technical and unfamiliar.  It may appear blunt or direct.  Medical documents are intended to carry relevant information, facts as evident, and the clinical opinion of the practitioner.         [1]   Allergies  Allergen Reactions    Strawberries FACE FLUSHING    Seasonal ITCHING

## 2025-03-06 ENCOUNTER — HOSPITAL ENCOUNTER (OUTPATIENT)
Facility: HOSPITAL | Age: 30
Discharge: HOME | End: 2025-03-06
Attending: OBSTETRICS & GYNECOLOGY | Admitting: OBSTETRICS & GYNECOLOGY
Payer: COMMERCIAL

## 2025-03-06 ENCOUNTER — ANESTHESIA (OUTPATIENT)
Dept: OBGYN UNIT | Facility: HOSPITAL | Age: 30
End: 2025-03-06
Payer: COMMERCIAL

## 2025-03-06 ENCOUNTER — ANESTHESIA EVENT (OUTPATIENT)
Dept: OBGYN UNIT | Facility: HOSPITAL | Age: 30
End: 2025-03-06
Payer: COMMERCIAL

## 2025-03-06 VITALS
OXYGEN SATURATION: 98 % | HEIGHT: 67 IN | BODY MASS INDEX: 36.57 KG/M2 | WEIGHT: 233 LBS | DIASTOLIC BLOOD PRESSURE: 55 MMHG | SYSTOLIC BLOOD PRESSURE: 100 MMHG | RESPIRATION RATE: 15 BRPM | TEMPERATURE: 98 F | HEART RATE: 67 BPM

## 2025-03-06 LAB
BASOPHILS # BLD AUTO: 0.05 X10(3) UL (ref 0–0.2)
BASOPHILS NFR BLD AUTO: 0.5 %
DEPRECATED RDW RBC AUTO: 38.5 FL (ref 35.1–46.3)
EOSINOPHIL # BLD AUTO: 0.14 X10(3) UL (ref 0–0.7)
EOSINOPHIL NFR BLD AUTO: 1.3 %
ERYTHROCYTE [DISTWIDTH] IN BLOOD BY AUTOMATED COUNT: 11.8 % (ref 11–15)
HCT VFR BLD AUTO: 37.6 %
HGB BLD-MCNC: 13.5 G/DL
IMM GRANULOCYTES # BLD AUTO: 0.03 X10(3) UL (ref 0–1)
IMM GRANULOCYTES NFR BLD: 0.3 %
LYMPHOCYTES # BLD AUTO: 2.71 X10(3) UL (ref 1–4)
LYMPHOCYTES NFR BLD AUTO: 24.6 %
MCH RBC QN AUTO: 32.2 PG (ref 26–34)
MCHC RBC AUTO-ENTMCNC: 35.9 G/DL (ref 31–37)
MCV RBC AUTO: 89.7 FL
MONOCYTES # BLD AUTO: 0.71 X10(3) UL (ref 0.1–1)
MONOCYTES NFR BLD AUTO: 6.4 %
NEUTROPHILS # BLD AUTO: 7.38 X10 (3) UL (ref 1.5–7.7)
NEUTROPHILS # BLD AUTO: 7.38 X10(3) UL (ref 1.5–7.7)
NEUTROPHILS NFR BLD AUTO: 66.9 %
PLATELET # BLD AUTO: 248 10(3)UL (ref 150–450)
RBC # BLD AUTO: 4.19 X10(6)UL
WBC # BLD AUTO: 11 X10(3) UL (ref 4–11)

## 2025-03-06 PROCEDURE — 59320 REVISION OF CERVIX: CPT

## 2025-03-06 PROCEDURE — 85025 COMPLETE CBC W/AUTO DIFF WBC: CPT | Performed by: OBSTETRICS & GYNECOLOGY

## 2025-03-06 RX ORDER — NICOTINE POLACRILEX 4 MG
15 LOZENGE BUCCAL
Status: DISCONTINUED | OUTPATIENT
Start: 2025-03-06 | End: 2025-03-06

## 2025-03-06 RX ORDER — HYDROMORPHONE HYDROCHLORIDE 1 MG/ML
0.2 INJECTION, SOLUTION INTRAMUSCULAR; INTRAVENOUS; SUBCUTANEOUS EVERY 5 MIN PRN
Status: DISCONTINUED | OUTPATIENT
Start: 2025-03-06 | End: 2025-03-06

## 2025-03-06 RX ORDER — DEXTROSE MONOHYDRATE 25 G/50ML
50 INJECTION, SOLUTION INTRAVENOUS
Status: DISCONTINUED | OUTPATIENT
Start: 2025-03-06 | End: 2025-03-06

## 2025-03-06 RX ORDER — HYDROMORPHONE HYDROCHLORIDE 1 MG/ML
0.6 INJECTION, SOLUTION INTRAMUSCULAR; INTRAVENOUS; SUBCUTANEOUS EVERY 5 MIN PRN
Status: DISCONTINUED | OUTPATIENT
Start: 2025-03-06 | End: 2025-03-06

## 2025-03-06 RX ORDER — INDOMETHACIN 25 MG/1
25 CAPSULE ORAL EVERY 6 HOURS PRN
Status: DISCONTINUED | OUTPATIENT
Start: 2025-03-06 | End: 2025-03-06

## 2025-03-06 RX ORDER — SODIUM CHLORIDE, SODIUM LACTATE, POTASSIUM CHLORIDE, CALCIUM CHLORIDE 600; 310; 30; 20 MG/100ML; MG/100ML; MG/100ML; MG/100ML
INJECTION, SOLUTION INTRAVENOUS CONTINUOUS
Status: DISCONTINUED | OUTPATIENT
Start: 2025-03-06 | End: 2025-03-06

## 2025-03-06 RX ORDER — LIDOCAINE HYDROCHLORIDE 10 MG/ML
INJECTION, SOLUTION INFILTRATION; PERINEURAL
Status: COMPLETED | OUTPATIENT
Start: 2025-03-06 | End: 2025-03-06

## 2025-03-06 RX ORDER — MORPHINE SULFATE 2 MG/ML
2 INJECTION, SOLUTION INTRAMUSCULAR; INTRAVENOUS EVERY 10 MIN PRN
Status: DISCONTINUED | OUTPATIENT
Start: 2025-03-06 | End: 2025-03-06

## 2025-03-06 RX ORDER — NALOXONE HYDROCHLORIDE 0.4 MG/ML
0.08 INJECTION, SOLUTION INTRAMUSCULAR; INTRAVENOUS; SUBCUTANEOUS AS NEEDED
Status: DISCONTINUED | OUTPATIENT
Start: 2025-03-06 | End: 2025-03-06

## 2025-03-06 RX ORDER — HYDRALAZINE HYDROCHLORIDE 20 MG/ML
INJECTION INTRAMUSCULAR; INTRAVENOUS
Status: DISCONTINUED
Start: 2025-03-06 | End: 2025-03-06

## 2025-03-06 RX ORDER — NICOTINE POLACRILEX 4 MG
30 LOZENGE BUCCAL
Status: DISCONTINUED | OUTPATIENT
Start: 2025-03-06 | End: 2025-03-06

## 2025-03-06 RX ORDER — BUPIVACAINE HYDROCHLORIDE 7.5 MG/ML
INJECTION, SOLUTION INTRASPINAL
Status: COMPLETED | OUTPATIENT
Start: 2025-03-06 | End: 2025-03-06

## 2025-03-06 RX ORDER — CITRIC ACID/SODIUM CITRATE 334-500MG
30 SOLUTION, ORAL ORAL ONCE
Status: COMPLETED | OUTPATIENT
Start: 2025-03-06 | End: 2025-03-06

## 2025-03-06 RX ORDER — ONDANSETRON 2 MG/ML
4 INJECTION INTRAMUSCULAR; INTRAVENOUS EVERY 4 HOURS PRN
Status: DISCONTINUED | OUTPATIENT
Start: 2025-03-06 | End: 2025-03-06

## 2025-03-06 RX ORDER — MORPHINE SULFATE 4 MG/ML
4 INJECTION, SOLUTION INTRAMUSCULAR; INTRAVENOUS EVERY 10 MIN PRN
Status: DISCONTINUED | OUTPATIENT
Start: 2025-03-06 | End: 2025-03-06

## 2025-03-06 RX ORDER — MORPHINE SULFATE 10 MG/ML
6 INJECTION, SOLUTION INTRAMUSCULAR; INTRAVENOUS EVERY 10 MIN PRN
Status: DISCONTINUED | OUTPATIENT
Start: 2025-03-06 | End: 2025-03-06

## 2025-03-06 RX ORDER — HYDROMORPHONE HYDROCHLORIDE 1 MG/ML
0.4 INJECTION, SOLUTION INTRAMUSCULAR; INTRAVENOUS; SUBCUTANEOUS EVERY 5 MIN PRN
Status: DISCONTINUED | OUTPATIENT
Start: 2025-03-06 | End: 2025-03-06

## 2025-03-06 RX ADMIN — LIDOCAINE HYDROCHLORIDE 5 ML: 10 INJECTION, SOLUTION INFILTRATION; PERINEURAL at 07:37:00

## 2025-03-06 RX ADMIN — SODIUM CHLORIDE, SODIUM LACTATE, POTASSIUM CHLORIDE, CALCIUM CHLORIDE: 600; 310; 30; 20 INJECTION, SOLUTION INTRAVENOUS at 07:57:00

## 2025-03-06 RX ADMIN — BUPIVACAINE HYDROCHLORIDE 1 ML: 7.5 INJECTION, SOLUTION INTRASPINAL at 07:37:00

## 2025-03-06 NOTE — PROGRESS NOTES
Pt is a 29 year old female admitted to [facility]5/[facility]5-A.     Chief Complaint   Patient presents with    Cerclage     Scheduled 730 Cerclage      Pt is  14w0d intra-uterine pregnancy.  History obtained, consents signed. Oriented to room, staff, and plan of care.

## 2025-03-06 NOTE — H&P
[facility]  Maternal-Fetal Medicine H&P    Date of Admission:  3/6/2025    History of Present Illness:  Kari Baires is a a(n) 29 year old female.  with an IUP at Gestational Age: 14w0d    Who  presents for prophylactic cerclage.  She had a rescue cerclage at ~ 18 weeks in her first pregnancy (dilated 2-3 cm) and had a successful cerclage and delivered at term.  After her outpatient consultation, Kari decided to proceed with a prophylactic cerclage rather than serial cervical length assessments.      Review of History:      OB History:    OB History    Para Term  AB Living   2 1 1 0 0 1   SAB IAB Ectopic Multiple Live Births   0 0 0 0 1      # Outcome Date GA Lbr Jatin/2nd Weight Sex Type Anes PTL Lv   2 Current            1 Term 23 39w2d 12:00 / 00:22 7 lb 11.5 oz (3.5 kg) M NORMAL SPONT Local N HEATH      Complications:  labor, Fetal tachycardia, Other - see comments      Name: KARENA BAIRES      Apgar1: 8  Apgar5: 9       Other Relevant History:  Past Medical History:    Anxiety    Cervical incompetence    At 18 weeks gestation    Decorative tattoo    Depression    Obesity (BMI 35.0-39.9 without comorbidity)    Polycystic bilateral ovaries    Thyroid disease     Past Surgical History:   Procedure Laterality Date    Hc cervical cerclage N/A 2023    Shirley cerclage     No family history on file.   reports that she has never smoked. She has never used smokeless tobacco. She reports that she does not currently use alcohol. She reports that she does not use drugs.    Allergies:  Allergies[1]    Medications:    Current Facility-Administered Medications:     lactated ringers infusion, , Intravenous, Continuous    ceFAZolin (Ancef) 2g in 10mL IV syringe premix, 2 g, Intravenous, Once      Physical Exam:   General: Alert, orientated x3.  Cooperative.  No apparent distress.  Vital Signs:  /74 (BP Location: Left arm)   Pulse 76   Temp 98.1 °F (36.7 °C)  (Oral)   Resp 16   LMP 2024 (Exact Date)   HEENT: Exam is unremarkable.  Abdomen:  Gravid uterus appropriate for GA Nontender.  Extremities:  No lower extremity edema noted.  Skin: Normal texture and turgor.  Cervic deferred to OR    IUP at 14w0d      Laboratory Data:  Lab Results   Component Value Date    WBC 11.0 2025    HGB 13.5 2025    HCT 37.6 2025    .0 2025       Fetal Ultrasound:  Limited for FHR -   Anterior placenta, normal fluid, transverse lie.  FHR - 138 bpm    NARRATIVE:   PREOPERATIVE COUNSELING  I reviewed that cerclage may help reduce the risk of a previable birth or reduce the risk or delay the onset of a  birth.  Cerclage placement is unlikely to reduce  birth which is the result of non-modifiable factors (e.g.: placental abruption, intra-amniotic infection).      I discussed the risks and benefits of cerclage placement  including PROM,  labor, bleeding, infection and although rare, maternal death.  I also reviewed that cerclage removal would be advised in the presence of active  labor, intra-amniotic infection, PROM or significant vaginal bleeding.  I discussed the need for continued monitoring for such  complications.     After counseling, she wishes to proceed with a cerclage.  Admission, and post admission procedures and expectations were discussed in detail.  All questions answered, all appropriate consents have been signed.      IMPRESSION:    IUP at 14w0d  H/o rescue cerclage    PLAN:    Prophylactic Shirley cerclage  Follow up in 1 week with RICHELLE Mendez MD  3/6/2025  7:10 AM         [1]   Allergies  Allergen Reactions    Strawberries FACE FLUSHING    Seasonal ITCHING

## 2025-03-06 NOTE — ANESTHESIA PROCEDURE NOTES
Spinal Block    Date/Time: 3/6/2025 7:37 AM    Performed by: Rogelio Gifford MD  Authorized by: Rogelio Gifford MD      General Information and Staff    Start Time:  3/6/2025 7:37 AM  End Time:  3/6/2025 7:48 AM  Anesthesiologist:  Rogelio Gifford MD  Performed by:  Anesthesiologist  Patient Location:  OR  Site identification: surface landmarks    Reason for Block: at surgeon's request, post-op pain management and surgical anesthesia    Preanesthetic Checklist: patient identified, IV checked, risks and benefits discussed, monitors and equipment checked, pre-op evaluation, timeout performed, anesthesia consent and sterile technique used      Procedure Details    Patient Position:  Sitting  Prep: Betadine and patient draped    Monitoring:  Heart rate, cardiac monitor and continuous pulse ox  Approach:  Midline  Location:  L3-4  Injection Technique:  Single-shot    Needle    Needle Type:  Sprotte  Needle Gauge:  24 G  Needle Length:  3.5 in    Assessment    Sensory Level:  T6  Events: clear CSF, well tolerated and blood negative      Additional Comments

## 2025-03-06 NOTE — DISCHARGE PLANNING
Discharge education provided to patient. IV removed. Patient assisted to entrance where ride is waiting.

## 2025-03-06 NOTE — ANESTHESIA PREPROCEDURE EVALUATION
Anesthesia PreOp Note    HPI:     Kari Baires is a 29 year old female who presents for preoperative consultation requested by: Hazel Mendez MD    Date of Surgery: 3/6/2025    Procedure(s):  CERCLAGE  Indication: * No pre-op diagnosis entered *    Relevant Problems   No relevant active problems       NPO:                         History Review:  Patient Active Problem List    Diagnosis Date Noted    Obesity (BMI 35.0-39.9 without comorbidity) 01/23/2025    Hx of gestational diabetes in prior pregnancy, currently pregnant, first trimester (HCC) 01/23/2025    Anxiety 01/23/2025    Incompetent cervix 02/23/2023       Past Medical History:    Anxiety    Cervical incompetence    At 18 weeks gestation    Decorative tattoo    Depression    Obesity (BMI 35.0-39.9 without comorbidity)    Polycystic bilateral ovaries    Thyroid disease       Past Surgical History:   Procedure Laterality Date    Hc cervical cerclage N/A 01/05/2023    Shirley cerclage       Prescriptions Prior to Admission[1]  Current Medications and Prescriptions Ordered in Epic[2]    Allergies[3]    No family history on file.  Social History     Socioeconomic History    Marital status:    Tobacco Use    Smoking status: Never    Smokeless tobacco: Never   Vaping Use    Vaping status: Never Used   Substance and Sexual Activity    Alcohol use: Not Currently    Drug use: Never       Available pre-op labs reviewed.  Lab Results   Component Value Date    WBC 11.0 03/06/2025    RBC 4.19 03/06/2025    HGB 13.5 03/06/2025    HCT 37.6 03/06/2025    MCV 89.7 03/06/2025    MCH 32.2 03/06/2025    MCHC 35.9 03/06/2025    RDW 11.8 03/06/2025    .0 03/06/2025     Lab Results   Component Value Date     02/12/2025    K 3.6 02/12/2025     02/12/2025    CO2 25.0 02/12/2025    BUN 5 (L) 02/12/2025    CREATSERUM 0.51 (L) 02/12/2025    GLU 88 02/12/2025    CA 9.0 02/12/2025          Vital Signs:  There is no height or weight on file to calculate  BMI.   oral temperature is 98.1 °F (36.7 °C). Her blood pressure is 120/74 and her pulse is 76. Her respiration is 16.   Vitals:    03/06/25 0616   BP: 120/74   Pulse: 76   Resp: 16   Temp: 98.1 °F (36.7 °C)   TempSrc: Oral        Anesthesia Evaluation     Patient summary reviewed and Nursing notes reviewed    Airway   Mallampati: II  TM distance: >3 FB  Neck ROM: full  Dental - Dentition appears grossly intact     Pulmonary - negative ROS and normal exam   Cardiovascular - negative ROS and normal exam    Neuro/Psych    (+)  anxiety/panic attacks,  depression      GI/Hepatic/Renal - negative ROS     Endo/Other      Comments: Obese,  IUP, 14 w, incompetent cervix  Abdominal   (+) obese                 Anesthesia Plan:   ASA:  2  Plan:   Spinal  Post-op Pain Management: IV analgesics  Informed Consent Plan and Risks Discussed With:  Patient and mother  Discussed plan with:  Surgeon      I have informed Kari Baires and/or legal guardian or family member of the nature of the anesthetic plan, benefits, risks including possible dental damage if relevant, major complications, and any alternative forms of anesthetic management.   All of the patient's questions were answered to the best of my ability. The patient desires the anesthetic management as planned.  Rogelio Gifford MD  3/6/2025 7:22 AM  Present on Admission:  **None**           [1]   Medications Prior to Admission   Medication Sig Dispense Refill Last Dose/Taking    Prenatal 28-0.8 MG Oral Tab Take 1 tablet by mouth daily.   3/5/2025 at  8:00 PM    sertraline 25 MG Oral Tab 1 TAB BY MOUTH EVERY DAY FOR 2 WEEKS, THEN 2 TABS BY MOUTH EVERY DAY   3/6/2025 at  5:00 AM    Pyridoxine HCl 25 MG Oral Tab Take 1 tablet (25 mg total) by mouth 3 (three) times daily as needed. 60 tablet 3     Ferrous Sulfate 325 (65 Fe) MG Oral Tab Take 1 tablet (325 mg total) by mouth every morning before breakfast. 90 tablet 3     Calcium 500 MG Oral Tab Take 1,000 mg by mouth  daily for 60 doses. 120 tablet 2     Cholecalciferol (VITAMIN D) 50 MCG (2000 UT) Oral Cap Take 1 capsule (2,000 Units total) by mouth daily. 90 capsule 2    [2]   Current Facility-Administered Medications Ordered in Epic   Medication Dose Route Frequency Provider Last Rate Last Admin    lactated ringers infusion   Intravenous Continuous Hazel Mendez  mL/hr at 03/06/25 0625 New Bag at 03/06/25 0625    ceFAZolin (Ancef) 2g in 10mL IV syringe premix  2 g Intravenous Once Hazel Mendez MD        ondansetron (Zofran) 4 MG/2ML injection 4 mg  4 mg Intravenous Q4H PRN Hazel Mendez MD         No current [facility]-ordered outpatient medications on file.   [3]   Allergies  Allergen Reactions    Strawberries FACE FLUSHING    Seasonal ITCHING

## 2025-03-06 NOTE — ANESTHESIA POSTPROCEDURE EVALUATION
Patient: Kari Bairse    Procedure Summary       Date: 03/06/25 Room / Location: [facility] L+D OR 01 / [facility] L+D OR    Anesthesia Start: 0733 Anesthesia Stop:     Procedure: CERCLAGE (Bilateral) Diagnosis:     Surgeons: Hazel Mendez MD Anesthesiologist: Rogelio Gifford MD    Anesthesia Type: spinal ASA Status: 2            Anesthesia Type: No value filed.    Vitals Value Taken Time   /59 03/06/25 0808   Temp 97.8 °F (36.6 °C) 03/06/25 0808   Pulse 92 03/06/25 0808   Resp 25 03/06/25 0808   SpO2 98 % 03/06/25 0808   Vitals shown include unfiled device data.    [facility] AN Post Evaluation:   Patient Evaluated in PACU  Patient Participation: complete - patient participated  Level of Consciousness: awake  Pain Management: adequate  Airway Patency:patent  Dental exam unchanged from preop  Yes    Cardiovascular Status: acceptable  Respiratory Status: acceptable  Postoperative Hydration acceptable  Comments: Starting to move the legs      Rogelio Gifford MD  3/6/2025 8:09 AM

## 2025-03-06 NOTE — OPERATIVE REPORT
Date of surgery:  3/6/25    Preoperative Diagnosis: 1. IUP at 14w0d  2.  Incompetent cervix in prior pregnancy      Postoperative diagnosis:    Same    Procedure:  Shirley Cerclage    Surgeon:  Hazel Mendez M.D.  Estimated Blood loss:   5 ml  Complications:   None  Instrument count:  Correct  Anesthesia:  Spinal    Technique:   After adequate anesthesia was achieved, she was draped and prepped in the usual fashion in a  Dorsal lithomy position. SVE: multiparous closed cervix,  A weighted speculum was placed into the vagina and cervix was exposed.   The cervix was grasped with a ring forcep at 12 O'clock and 6 O'clock positions.  Gentle traction applied.   A stitch of number 5 Ethibond was used to place the cerclage in a purse string type manner.  The stitch was tied at the 12 O'clock position.  No complications.         A straight catheter was then used to drain the bladder of  100 ml of clear yellow urine.   She tolerated the procedure well and was returned to her room in good condition.    Discharge home is planned after she recovers from the spinal.  Follow up next week in [facility].

## 2025-03-06 NOTE — BRIEF OP NOTE
Pre-Operative Diagnosis: h/o incompetent cervix; 14 weeks gestation     Post-Operative Diagnosis: same     Procedure Performed:   CERCLAGE    Surgeons and Role:     * Hazel Mendez MD - Primary    Assistant(s):        Surgical Findings: multiparous cervix, closed     Specimen: none     Estimated Blood Loss: 5    Dictation Number:  none - typed in epic    Hazel Mendez MD  3/6/2025  8:03 AM

## 2025-03-06 NOTE — PROGRESS NOTES
Outpatient Maternal-Fetal Medicine Follow-Up     Dear Dr. Fuentes,     Thank you for requesting ultrasound evaluation and maternal fetal medicine consultation on your patient Kari Baires.  As you are aware she is a 29/30 year old female  with a Spicer pregnancy and an Estimated Date of Delivery: 25.  She returned to maternal-fetal medicine today for a follow-up visit.  Her history was reviewed from her prior visit and there were no interval changes.     Antepartum Risk Factors  Prominent cervix in her prior pregnancy  Class II obesity complicating pregnancy  History of cerclage and Shirley cerclage was placed in this pregnancy  Anxiety controlled on sertraline    S: She reports that she feels great.  No pressure or spotting or bleeding  PHYSICAL EXAMINATION:  /73   Pulse 89   Wt 234 lb (106.1 kg)   LMP 2024 (Exact Date)   BMI 36.65 kg/m²   General: alert and oriented, no acute distress  Abdomen: gravid, soft, non-tender  Extremities: non-tender, no edema     OBSTETRIC ULTRASOUND  The patient had a vaginal ultrasound today which I interpreted the results and reviewed them with the patient.    Ultrasound Findings:  Single IUP in cephalic presentation.    Placenta is anterior, high.   A 3 vessel cord is noted.  Cardiac activity is present at 153 bpm  MVP is 3 cm  The cervix was not able to be clearly visualized and appeared short by transabdominal ultrasound, therefore transvaginal ultrasound was performed. Transvaginal US findings: Cervix is closed, long and no funneling seen measuring 44 mm     See imaging tab for the complete US report.     DISCUSSION  During her visit we discussed and reviewed the following issues:  OBESITY:  Her BMI prior to pregnancy was 35.4  Obesity during pregnancy is associated with numerous maternal and  risks which were discussed previously and reviewed.  See MFM note from 2025 for detailed review.        H/o incompetent cervix and  cerclage -   See M note from 2/20/2025 for detailed review.  She had her cerclage placed on 3/6/2025 using Ethibond suture.  It was tagged at the 12 o'clock position.  She has been doing well since surgery.    We discussed the role of vaginal progesterone in addition to cervical cerclage.  It is routine to use this when there is a rescue cerclage and it is an option when it is a prophylactic cerclage.  In light of the normal cervical length today, mutually agreed that vaginal progesterone is not necessary.      Anxiety -controlled on sertraline  Discussed previously and reviewed.  See M note from 2/20/2025 for detailed review     We discussed the plan of care and her risk factors for recurrent GDM.  Kari had her questions answered to her satisfaction.     IMPRESSION:  IUP at 15w0d  Normal cervical length; cerclage is in good position  Class II obesity complicating pregnancy  History of gestational diabetes  History of cervical incompetence and cerclage placement  Anxiety, controlled with sertraline     RECOMMENDATIONS:  Continue care with Dr. Fuentes  Plan level 2 & CL ultrasound at 20 weeks  Third trimester growth ultrasound and BPP around 32 weeks  Weekly NST at 36 weeks  Plan cerclage removal at 36 weeks     Total time spent 30 minutes this calendar day which includes preparing to see the patient including chart review, obtaining and/or reviewing additional medical history, performing a physical exam and evaluation, documenting clinical information in the electronic medical record, independently interpreting results, counseling the patient, communicating results to the patient/family/caregiver and coordinating care.     Case discussed with patient who demonstrated understanding and agreement with plan.     Thank you for allowing me to participate in the care of this patient.  Please feel free to contact me with any questions.    Hazel Mendez MD  Maternal-Fetal Medicine       Note to patient and family:  The  21st Century Cures Act makes medical notes available to patients in the interest of transparency.  However, please be advised that this is a medical document.  It is intended as a peer to peer communication.  It is written in medical language and may contain abbreviations or verbiage that are technical and unfamiliar.  It may appear blunt or direct.  Medical documents are intended to carry relevant information, facts as evident, and the clinical opinion of the practitioner.

## 2025-03-13 ENCOUNTER — HOSPITAL ENCOUNTER (OUTPATIENT)
Dept: PERINATAL CARE | Facility: HOSPITAL | Age: 30
Discharge: HOME | End: 2025-03-13
Attending: OBSTETRICS & GYNECOLOGY
Payer: COMMERCIAL

## 2025-03-13 VITALS
BODY MASS INDEX: 37 KG/M2 | DIASTOLIC BLOOD PRESSURE: 73 MMHG | WEIGHT: 234 LBS | SYSTOLIC BLOOD PRESSURE: 114 MMHG | HEART RATE: 89 BPM

## 2025-03-13 DIAGNOSIS — N88.3 INCOMPETENT CERVIX: ICD-10-CM

## 2025-03-13 DIAGNOSIS — O34.32: ICD-10-CM

## 2025-03-13 DIAGNOSIS — O09.292 HISTORY OF INCOMPETENT CERVIX, CURRENTLY PREGNANT IN SECOND TRIMESTER (HCC): ICD-10-CM

## 2025-03-13 DIAGNOSIS — N88.3 INCOMPETENT CERVIX: Primary | ICD-10-CM

## 2025-03-13 PROCEDURE — 76817 TRANSVAGINAL US OBSTETRIC: CPT | Performed by: OBSTETRICS & GYNECOLOGY

## 2025-03-13 PROCEDURE — 76815 OB US LIMITED FETUS(S): CPT

## 2025-03-26 ENCOUNTER — ROUTINE PRENATAL (OUTPATIENT)
Dept: OBGYN CLINIC | Facility: CLINIC | Age: 30
End: 2025-03-26

## 2025-03-26 VITALS
DIASTOLIC BLOOD PRESSURE: 73 MMHG | SYSTOLIC BLOOD PRESSURE: 107 MMHG | WEIGHT: 237 LBS | BODY MASS INDEX: 37 KG/M2 | HEART RATE: 84 BPM

## 2025-03-26 DIAGNOSIS — O99.212 OBESITY AFFECTING PREGNANCY IN SECOND TRIMESTER, UNSPECIFIED OBESITY TYPE (HCC): Primary | ICD-10-CM

## 2025-03-26 NOTE — PROGRESS NOTES
Kari Baires is a 29 year old female  Patient's last menstrual period was 2024 (exact date).   Chief Complaint   Patient presents with    Prenatal Care     Pt presents for repeat OB visit       OBSTETRICS HISTORY:     OB History    Para Term  AB Living   2 1 1     1   SAB IAB Ectopic Multiple Live Births           1      # Outcome Date GA Lbr Jatin/2nd Weight Sex Type Anes PTL Lv   2 Current            1 Term 23 39w2d 12:00 / 00:22 7 lb 11.5 oz (3.5 kg) M NORMAL SPONT Local N HEATH      Complications:  labor, Fetal tachycardia, Other - see comments       GYNE HISTORY:         Menarche: 11 (2025  3:43 PM)  Period Cycle (Days): 28 (2025  3:43 PM)  Period Duration (Days): 3-4 (2025  3:43 PM)  Period Flow: moderate (2025  3:43 PM)  Use of Birth Control (if yes, specify type): None (2025  3:43 PM)  Hx Prior Abnormal Pap: No (2025  3:43 PM)         No data to display                  MEDICAL HISTORY:     Past Medical History:    Anxiety    Cervical incompetence    At 18 weeks gestation    Decorative tattoo    Depression    Obesity (BMI 35.0-39.9 without comorbidity)    Polycystic bilateral ovaries    Thyroid disease       SURGICAL HISTORY:     Past Surgical History:   Procedure Laterality Date    Hc cervical cerclage N/A 2023    Shirley cerclage       SOCIAL HISTORY:     Social History     Socioeconomic History    Marital status:    Tobacco Use    Smoking status: Never    Smokeless tobacco: Never   Vaping Use    Vaping status: Never Used   Substance and Sexual Activity    Alcohol use: Not Currently    Drug use: Never     Social Drivers of Health      Received from [employer] [facility] [employer]    Food Insecurities    Received from [employer] [facility] [employer]    Transportation   Stress: No Stress Concern Present (2023)    Stress     Feeling of Stress : No    Received from [employer] and [employer]    Housing/Utilities        FAMILY HISTORY:     No family history on file.    MEDICATIONS:       Current Outpatient Medications:     sertraline 50 MG Oral Tab, Take 1 tablet (50 mg total) by mouth daily., Disp: , Rfl:     Prenatal 28-0.8 MG Oral Tab, Take 1 tablet by mouth daily., Disp: , Rfl:     Pyridoxine HCl 25 MG Oral Tab, Take 1 tablet (25 mg total) by mouth 3 (three) times daily as needed., Disp: 60 tablet, Rfl: 3    Ferrous Sulfate 325 (65 Fe) MG Oral Tab, Take 1 tablet (325 mg total) by mouth every morning before breakfast., Disp: 90 tablet, Rfl: 3    Cholecalciferol (VITAMIN D) 50 MCG (2000 UT) Oral Cap, Take 1 capsule (2,000 Units total) by mouth daily., Disp: 90 capsule, Rfl: 2    sertraline 25 MG Oral Tab, 1 TAB BY MOUTH EVERY DAY FOR 2 WEEKS, THEN 2 TABS BY MOUTH EVERY DAY (Patient not taking: Reported on 3/26/2025), Disp: , Rfl:     ALLERGIES:     Allergies[1]      REVIEW OF SYSTEMS:     Constitutional:    denies fever / chills  Cardiovascular:  denies chest pain or palpitations  Respiratory:    denies shortness of breath  Gastrointestinal:  denies severe abdominal pain, frequent diarrhea or constipation, nausea / vomiting  Genitourinary:    denies dysuria, bothersome incontinence  Skin/Breast:   denies any breast pain, lumps, or discharge  Neurological:    denies frequent severe headaches  Psychiatric:   denies depression or anxiety, thoughts of harming self or others      PHYSICAL EXAM:   Blood pressure 107/73, pulse 84, weight 237 lb (107.5 kg), last menstrual period 11/28/2024, currently breastfeeding.  Constitutional:  well developed, well nourished, no distress  Abdomen:   soft, gravid, nontender  Musculoskeletal: no cva tenderness bilaterally  Skin/Hair:  no unusual rashes or bruises  Extremities:  no edema, no cyanosis, non tender bilaterally  Psychiatric:   oriented to time, place, person and situation. Appropriate mood and affect      ASSESSMENT & PLAN:     There are no diagnoses  linked to this encounter.  Taking pnv , no iron  Afp, ferritin  Level at 20 wks, pt to get in [place]    FOLLOW-UP     No follow-ups on file.      Sav Fuentes MD  3/26/2025         [1]   Allergies  Allergen Reactions    Strawberry RASH and SWELLING    Strawberries FACE FLUSHING    Seasonal ITCHING

## (undated) NOTE — LETTER
[facility]  Administration of Anesthesia  I, Kari Baires agree to be cared for by a physician anesthesiologist alone and/or with a nurse anesthetist, who is specially trained to monitor me and give me medicine to put me to sleep or keep me comfortable during my procedure    I understand that my anesthesiologist and/or anesthetist is not an employee or agent of [facility] or [employer]. He or she works for [facility]    As the patient asking for anesthesia services, I agree to:  Allow the anesthesiologist (anesthesia doctor) to give me medicine and do additional procedures as necessary. Some examples are: Starting or using an “IV” to give me medicine, fluids or blood during my procedure, and having a breathing tube placed to help me breathe when I’m asleep (intubation). In the event that my heart stops working properly, I understand that my anesthesiologist will make every effort to sustain my life, unless otherwise directed by [facility] Do Not Resuscitate documents.  Tell my anesthesia doctor before my procedure:  If I am pregnant.  The last time that I ate or drank.  iii. All of the medicines I take (including prescriptions, herbal supplements, and pills I can buy without a prescription (including street drugs/illegal medications). Failure to inform my anesthesiologist about these medicines may increase my risk of anesthetic complications.  iv.If I am allergic to anything or have had a reaction to anesthesia before.  I understand how the anesthesia medicine will help me (benefits).  I understand that with any type of anesthesia medicine there are risks:  The most common risks are: nausea, vomiting, sore throat, muscle soreness, damage to my eyes, mouth, or teeth (from breathing tube placement).  Rare risks include: remembering what happened during my procedure, allergic reactions to medications, injury to my airway, heart, lungs, vision, nerves, or  muscles and in extremely rare instances death.  My doctor has explained to me other choices available to me for my care (alternatives).  Pregnant Patients (“epidural”):  I understand that the risks of having an epidural (medicine given into my back to help control pain during labor), include itching, low blood pressure, difficulty urinating, headache or slowing of the baby’s heart. Very rare risks include infection, bleeding, seizure, irregular heart rhythms and nerve injury.  Regional Anesthesia (“spinal”, “epidural”, & “nerve blocks”):  I understand that rare but potential complications include headache, bleeding, infection, seizure, irregular heart rhythms, and nerve injury.    _____________________________________________________________________________  Patient (or Representative) Signature/Relationship to Patient  Date   Time    _____________________________________________________________________________   Name (if used)    Language/Organization   Time    _____________________________________________________________________________  Nurse Anesthetist Signature     Date   Time  _____________________________________________________________________________  Anesthesiologist Signature     Date   Time  I have discussed the procedure and information above with the patient (or patient’s representative) and answered their questions. The patient or their representative has agreed to have anesthesia services.    _____________________________________________________________________________  Witness        Date   Time  I have verified that the signature is that of the patient or patient’s representative, and that it was signed before the procedure  Patient Name: Kari Baires     : 1995                 Printed: 3/6/2025 at 10:50 AM    Medical Record #: [id number]                                            Page 1 of 1  ----------ANESTHESIA CONSENT----------

## (undated) NOTE — LETTER
[facility]  Administration of Anesthesia  I, Kari Baires agree to be cared for by a physician anesthesiologist alone and/or with a nurse anesthetist, who is specially trained to monitor me and give me medicine to put me to sleep or keep me comfortable during my procedure    I understand that my anesthesiologist and/or anesthetist is not an employee or agent of [facility] or [employer]. He or she works for [facility]    As the patient asking for anesthesia services, I agree to:  Allow the anesthesiologist (anesthesia doctor) to give me medicine and do additional procedures as necessary. Some examples are: Starting or using an “IV” to give me medicine, fluids or blood during my procedure, and having a breathing tube placed to help me breathe when I’m asleep (intubation). In the event that my heart stops working properly, I understand that my anesthesiologist will make every effort to sustain my life, unless otherwise directed by [facility] Do Not Resuscitate documents.  Tell my anesthesia doctor before my procedure:  If I am pregnant.  The last time that I ate or drank.  iii. All of the medicines I take (including prescriptions, herbal supplements, and pills I can buy without a prescription (including street drugs/illegal medications). Failure to inform my anesthesiologist about these medicines may increase my risk of anesthetic complications.  iv.If I am allergic to anything or have had a reaction to anesthesia before.  I understand how the anesthesia medicine will help me (benefits).  I understand that with any type of anesthesia medicine there are risks:  The most common risks are: nausea, vomiting, sore throat, muscle soreness, damage to my eyes, mouth, or teeth (from breathing tube placement).  Rare risks include: remembering what happened during my procedure, allergic reactions to medications, injury to my airway, heart, lungs, vision, nerves, or  muscles and in extremely rare instances death.  My doctor has explained to me other choices available to me for my care (alternatives).  Pregnant Patients (“epidural”):  I understand that the risks of having an epidural (medicine given into my back to help control pain during labor), include itching, low blood pressure, difficulty urinating, headache or slowing of the baby’s heart. Very rare risks include infection, bleeding, seizure, irregular heart rhythms and nerve injury.  Regional Anesthesia (“spinal”, “epidural”, & “nerve blocks”):  I understand that rare but potential complications include headache, bleeding, infection, seizure, irregular heart rhythms, and nerve injury.    _____________________________________________________________________________  Patient (or Representative) Signature/Relationship to Patient  Date   Time    _____________________________________________________________________________   Name (if used)    Language/Organization   Time    _____________________________________________________________________________  Nurse Anesthetist Signature     Date   Time  _____________________________________________________________________________  Anesthesiologist Signature     Date   Time  I have discussed the procedure and information above with the patient (or patient’s representative) and answered their questions. The patient or their representative has agreed to have anesthesia services.    _____________________________________________________________________________  Witness        Date   Time  I have verified that the signature is that of the patient or patient’s representative, and that it was signed before the procedure  Patient Name: Kari Baires     : 1995                 Printed: 3/6/2025 at 5:43 AM    Medical Record #: [id number]                                            Page 1 of 1  ----------ANESTHESIA CONSENT----------